# Patient Record
Sex: FEMALE | Race: WHITE | HISPANIC OR LATINO | Employment: PART TIME | URBAN - METROPOLITAN AREA
[De-identification: names, ages, dates, MRNs, and addresses within clinical notes are randomized per-mention and may not be internally consistent; named-entity substitution may affect disease eponyms.]

---

## 2019-09-05 DIAGNOSIS — K02.61 DENTAL CARIES ON SMOOTH SURFACE LIMITED TO ENAMEL: ICD-10-CM

## 2021-05-04 ENCOUNTER — OFFICE VISIT (OUTPATIENT)
Dept: FAMILY MEDICINE CLINIC | Facility: CLINIC | Age: 56
End: 2021-05-04

## 2021-05-04 VITALS
OXYGEN SATURATION: 98 % | DIASTOLIC BLOOD PRESSURE: 74 MMHG | TEMPERATURE: 97.6 F | SYSTOLIC BLOOD PRESSURE: 114 MMHG | HEIGHT: 65 IN | RESPIRATION RATE: 20 BRPM | BODY MASS INDEX: 21.84 KG/M2 | WEIGHT: 131.1 LBS | HEART RATE: 71 BPM

## 2021-05-04 DIAGNOSIS — M25.541 JOINT PAIN IN FINGERS OF BOTH HANDS: ICD-10-CM

## 2021-05-04 DIAGNOSIS — M62.830 MUSCLE SPASM OF BACK: Primary | ICD-10-CM

## 2021-05-04 DIAGNOSIS — M25.552 CHRONIC ARTHRALGIAS OF KNEES AND HIPS: ICD-10-CM

## 2021-05-04 DIAGNOSIS — G89.29 CHRONIC ARTHRALGIAS OF KNEES AND HIPS: ICD-10-CM

## 2021-05-04 DIAGNOSIS — M25.542 JOINT PAIN IN FINGERS OF BOTH HANDS: ICD-10-CM

## 2021-05-04 DIAGNOSIS — M25.562 CHRONIC ARTHRALGIAS OF KNEES AND HIPS: ICD-10-CM

## 2021-05-04 DIAGNOSIS — M25.561 CHRONIC ARTHRALGIAS OF KNEES AND HIPS: ICD-10-CM

## 2021-05-04 DIAGNOSIS — M25.551 CHRONIC ARTHRALGIAS OF KNEES AND HIPS: ICD-10-CM

## 2021-05-04 PROCEDURE — 99214 OFFICE O/P EST MOD 30 MIN: CPT | Performed by: FAMILY MEDICINE

## 2021-05-04 RX ORDER — BACLOFEN 10 MG/1
10 TABLET ORAL 2 TIMES DAILY
Qty: 60 TABLET | Refills: 0 | Status: SHIPPED | OUTPATIENT
Start: 2021-05-04 | End: 2021-06-15 | Stop reason: SDUPTHER

## 2021-05-04 NOTE — PROGRESS NOTES
Assessment/Plan:    No problem-specific Assessment & Plan notes found for this encounter  Diagnoses and all orders for this visit:    Muscle spasm of back  -     baclofen 10 mg tablet; Take 1 tablet (10 mg total) by mouth 2 (two) times a day    Joint pain in fingers of both hands    Chronic arthralgias of knees and hips        BW done through [facility] as ordered by GYN 2/2021, reviewed and discussed with patient  Rheumatological workup essentially negative except for Antinuclear Abs  dsDNA AutoAb <10 TITER Negative    SSA (Ro) AutoAb <20 GIANCARLO Unit <20    SSB (La) AutoAb <20 GIANCARLO Unit <20    SM/RNP AutoAb Negative  Negative    Sm (Marie) AutoAb Negative  Negative    SCL-70 AutoAb Negative  Negative      Rheumatoid Factor <15 IU/mL <10      Suggested moist heat for 15 minutes BID  Can you OTC Salon Pas or Bengay  Start MVI, ideally one specifically for women so it has higher dose of Vitamin D  TSH with Free T4 within normal limits    Subjective:      Patient ID: Acosta Doss is a 50 y o  female  49 yo  female, new patient to the office, with no known medical history complains of:  1- B/l knee pain  2- Hand stiffness R>L unchanged through the day  No swelling  About 1 year ago  Started with R pinky finger, over the last year has now extended to her whole hand  Pain/stiffness worse with activity  Has not tried any medications  Stiffness is accompanied by numbness  Denies weakness  3- Fatigue       The following portions of the patient's history were reviewed and updated as appropriate:   She  has no past medical history on file  She There are no active problems to display for this patient  She  has a past surgical history that includes Cyst Removal   Her family history includes Diabetes in her mother; No Known Problems in her father; Ovarian cancer in her sister  She  reports that she has never smoked   She has never used smokeless tobacco  She reports that she does not drink alcohol or use drugs   Current Outpatient Medications   Medication Sig Dispense Refill    baclofen 10 mg tablet Take 1 tablet (10 mg total) by mouth 2 (two) times a day 60 tablet 0     No current facility-administered medications for this visit  No current outpatient medications on file prior to visit  No current facility-administered medications on file prior to visit       Review of Systems   Musculoskeletal: Positive for arthralgias and myalgias  All other systems reviewed and are negative  Objective:      /74 (BP Location: Left arm, Patient Position: Sitting, Cuff Size: Standard)   Pulse 71   Temp 97 6 °F (36 4 °C) (Temporal)   Resp 20   Ht 5' 5" (1 651 m)   Wt 59 5 kg (131 lb 1 6 oz)   SpO2 98%   BMI 21 82 kg/m²          Physical Exam  Vitals signs and nursing note reviewed  Constitutional:       Appearance: She is well-developed  HENT:      Head: Normocephalic  Right Ear: External ear normal       Left Ear: External ear normal       Nose: Nose normal    Eyes:      Conjunctiva/sclera: Conjunctivae normal       Pupils: Pupils are equal, round, and reactive to light  Neck:      Musculoskeletal: Normal range of motion and neck supple  Thyroid: No thyromegaly  Cardiovascular:      Rate and Rhythm: Normal rate and regular rhythm  Heart sounds: Normal heart sounds  Pulmonary:      Effort: Pulmonary effort is normal       Breath sounds: Normal breath sounds  Abdominal:      Palpations: Abdomen is soft  Tenderness: There is no abdominal tenderness  There is no guarding or rebound  Musculoskeletal: Normal range of motion  General: Tenderness present  Right shoulder: She exhibits tenderness and spasm  Left shoulder: She exhibits tenderness and spasm  Cervical back: She exhibits tenderness and spasm  Skin:     General: Skin is dry  Neurological:      Mental Status: She is alert and oriented to person, place, and time        Deep Tendon Reflexes: Reflexes are normal and symmetric

## 2021-06-15 ENCOUNTER — OFFICE VISIT (OUTPATIENT)
Dept: FAMILY MEDICINE CLINIC | Facility: CLINIC | Age: 56
End: 2021-06-15

## 2021-06-15 VITALS
DIASTOLIC BLOOD PRESSURE: 80 MMHG | TEMPERATURE: 97.7 F | HEART RATE: 73 BPM | OXYGEN SATURATION: 98 % | HEIGHT: 65 IN | WEIGHT: 134.6 LBS | RESPIRATION RATE: 18 BRPM | BODY MASS INDEX: 22.42 KG/M2 | SYSTOLIC BLOOD PRESSURE: 110 MMHG

## 2021-06-15 DIAGNOSIS — M62.830 MUSCLE SPASM OF BACK: ICD-10-CM

## 2021-06-15 DIAGNOSIS — G56.03 BILATERAL CARPAL TUNNEL SYNDROME: Primary | ICD-10-CM

## 2021-06-15 PROCEDURE — 99213 OFFICE O/P EST LOW 20 MIN: CPT | Performed by: FAMILY MEDICINE

## 2021-06-15 RX ORDER — BACLOFEN 10 MG/1
10 TABLET ORAL 2 TIMES DAILY
Qty: 60 TABLET | Refills: 1 | Status: SHIPPED | OUTPATIENT
Start: 2021-06-15

## 2021-06-15 NOTE — PROGRESS NOTES
Assessment/Plan:    No problem-specific Assessment & Plan notes found for this encounter  Diagnoses and all orders for this visit:    Bilateral carpal tunnel syndrome  -     Cock Up Wrist Splint    Muscle spasm of back  -     baclofen 10 mg tablet; Take 1 tablet (10 mg total) by mouth 2 (two) times a day        Restart MVI, only one brand at a time (do NOT double up on vitamins), take in the am with food  Discussed sleep hygiene  Do NOT skip meals  Increase lean protein in your diet   Subjective:      Patient ID: Kevin Yao is a 50 y o  female  51 yo  female, new patient to the office, with no known medical history complains of:  1- B/l knee pain which has improved compared to last visit  2- Hand stiffness R>L unchanged through the day  No swelling  About 1 year ago  Started with R pinky finger, over the last year has now extended to her whole hand  Pain/stiffness worse with activity  Has not tried any medications  Stiffness is accompanied by numbness  Denies weakness  Patient states baclofen helped  3- Fatigue, took multivitamins but states it made her feel anxious (unclear in her account, however I understand she took 3 different MVI at once)  Fatigue  This is a chronic problem  The current episode started more than 1 year ago  The problem occurs constantly  Associated symptoms include fatigue  The following portions of the patient's history were reviewed and updated as appropriate:   She  has no past medical history on file  She There are no problems to display for this patient  She  has a past surgical history that includes Cyst Removal   Her family history includes Diabetes in her mother; No Known Problems in her father; Ovarian cancer in her sister  She  reports that she has never smoked  She has never used smokeless tobacco  She reports that she does not drink alcohol and does not use drugs    Current Outpatient Medications   Medication Sig Dispense Refill    baclofen 10 mg tablet Take 1 tablet (10 mg total) by mouth 2 (two) times a day 60 tablet 1     No current facility-administered medications for this visit  Current Outpatient Medications on File Prior to Visit   Medication Sig    [DISCONTINUED] baclofen 10 mg tablet Take 1 tablet (10 mg total) by mouth 2 (two) times a day     No current facility-administered medications on file prior to visit       Review of Systems   Constitutional: Positive for fatigue  Objective:      /80 (BP Location: Right arm, Patient Position: Sitting, Cuff Size: Standard)   Pulse 73   Temp 97 7 °F (36 5 °C) (Temporal)   Resp 18   Ht 5' 5" (1 651 m)   Wt 61 1 kg (134 lb 9 6 oz)   SpO2 98%   BMI 22 40 kg/m²          Physical Exam  Vitals and nursing note reviewed  Constitutional:       Appearance: She is well-developed  HENT:      Head: Normocephalic  Right Ear: External ear normal       Left Ear: External ear normal       Nose: Nose normal    Eyes:      Conjunctiva/sclera: Conjunctivae normal       Pupils: Pupils are equal, round, and reactive to light  Neck:      Thyroid: No thyromegaly  Cardiovascular:      Rate and Rhythm: Normal rate and regular rhythm  Heart sounds: Normal heart sounds  Pulmonary:      Effort: Pulmonary effort is normal       Breath sounds: Normal breath sounds  Abdominal:      Palpations: Abdomen is soft  Tenderness: There is no abdominal tenderness  There is no guarding or rebound  Musculoskeletal:         General: Tenderness present  Normal range of motion  Right shoulder: Tenderness present  Left shoulder: Tenderness present  Cervical back: Normal range of motion and neck supple  Swelling present  Skin:     General: Skin is dry  Neurological:      Mental Status: She is alert and oriented to person, place, and time  Deep Tendon Reflexes: Reflexes are normal and symmetric

## 2021-07-16 ENCOUNTER — OFFICE VISIT (OUTPATIENT)
Dept: DENTISTRY | Facility: CLINIC | Age: 56
End: 2021-07-16

## 2021-07-16 VITALS — DIASTOLIC BLOOD PRESSURE: 69 MMHG | HEART RATE: 68 BPM | SYSTOLIC BLOOD PRESSURE: 117 MMHG | TEMPERATURE: 97.6 F

## 2021-07-16 DIAGNOSIS — K02.61 DENTAL CARIES ON SMOOTH SURFACE LIMITED TO ENAMEL: Primary | ICD-10-CM

## 2021-07-16 PROCEDURE — D2391 RESIN-BASED COMPOSITE - 1 SURFACE, POSTERIOR: HCPCS | Performed by: DENTIST

## 2021-07-16 PROCEDURE — D2332 RESIN-BASED COMPOSITE - 3 SURFACES, ANTERIOR: HCPCS | Performed by: DENTIST

## 2021-07-16 NOTE — PROGRESS NOTES
Composite Filling    Tennis Julio presents for composite filling  PMH reviewed, no changes  Applied topical benzocaine, administered 1 carp 2% lido 1:100k epi via ASA block      Prepped teeth #7,11,15  with 245 carbide on high speed  Caries removed with round carbide on slow speed  Placed tofflemire/palodent matrix  Isolation with cotton rolls and dri-angles    Etch with 37% H2PO4, rinse, dry  Applied Adhese with 20 second scrub once, gentle air dry and light cured for 10s  Restored with Tetric bulk roseline shade A2 and light cured  Refined with finishing burs, polished with enhance point  Verified occlusion and contacts  Pt left satisfied      NV: Resin on tooth 12 MOD

## 2021-08-16 ENCOUNTER — OFFICE VISIT (OUTPATIENT)
Dept: DENTISTRY | Facility: CLINIC | Age: 56
End: 2021-08-16

## 2021-08-16 VITALS — SYSTOLIC BLOOD PRESSURE: 110 MMHG | TEMPERATURE: 97.5 F | DIASTOLIC BLOOD PRESSURE: 62 MMHG | HEART RATE: 63 BPM

## 2021-08-16 DIAGNOSIS — K02.9 CARIES: Primary | ICD-10-CM

## 2021-08-16 PROCEDURE — D2393 RESIN-BASED COMPOSITE - 3 SURFACES, POSTERIOR: HCPCS | Performed by: DENTIST

## 2021-08-16 NOTE — PROGRESS NOTES
Composite Filling    Nida Mcgee presents for composite filling #12-MOD  PMH reviewed, no changes  Applied topical benzocaine, administered 1 carp 4% articaine 1:100k epi via infiltration    Prepped tooth #12-MOD with 245 carbide on high speed  Previous deficient restoration removed and caries removed with round carbide on slow speed  Placed tofflemire/palodent matrix  Isolation with cotton rolls and dri-angles    Etch with 37% H2PO4, rinse, dry  Applied Adhese with 20 second scrub once, gentle air dry and light cured for 10s  Restored with Tetric bulk roseline shade A2 and light cured  Refined with finishing burs, polished with enhance point  Verified occlusion and contacts  Pt left satisfied      Dr Raya Fowler    NV: 6-mrc

## 2021-10-08 ENCOUNTER — OFFICE VISIT (OUTPATIENT)
Dept: FAMILY MEDICINE CLINIC | Facility: CLINIC | Age: 56
End: 2021-10-08

## 2021-10-08 VITALS
HEART RATE: 74 BPM | OXYGEN SATURATION: 98 % | WEIGHT: 128 LBS | SYSTOLIC BLOOD PRESSURE: 118 MMHG | BODY MASS INDEX: 21.3 KG/M2 | DIASTOLIC BLOOD PRESSURE: 72 MMHG | TEMPERATURE: 96.6 F | RESPIRATION RATE: 16 BRPM

## 2021-10-08 DIAGNOSIS — Z12.12 SCREENING FOR COLORECTAL CANCER: ICD-10-CM

## 2021-10-08 DIAGNOSIS — G56.01 CARPAL TUNNEL SYNDROME OF RIGHT WRIST: ICD-10-CM

## 2021-10-08 DIAGNOSIS — Z11.59 NEED FOR HEPATITIS C SCREENING TEST: Primary | ICD-10-CM

## 2021-10-08 DIAGNOSIS — Z00.00 ANNUAL PHYSICAL EXAM: ICD-10-CM

## 2021-10-08 DIAGNOSIS — Z12.4 SCREENING FOR CERVICAL CANCER: ICD-10-CM

## 2021-10-08 DIAGNOSIS — Z12.11 SCREENING FOR COLORECTAL CANCER: ICD-10-CM

## 2021-10-08 DIAGNOSIS — Z11.4 SCREENING FOR HIV (HUMAN IMMUNODEFICIENCY VIRUS): ICD-10-CM

## 2021-10-08 DIAGNOSIS — Z12.31 ENCOUNTER FOR SCREENING MAMMOGRAM FOR BREAST CANCER: ICD-10-CM

## 2021-10-08 PROCEDURE — 99396 PREV VISIT EST AGE 40-64: CPT | Performed by: FAMILY MEDICINE

## 2021-11-01 ENCOUNTER — APPOINTMENT (OUTPATIENT)
Dept: LAB | Facility: CLINIC | Age: 56
End: 2021-11-01

## 2021-11-01 DIAGNOSIS — Z11.4 SCREENING FOR HIV (HUMAN IMMUNODEFICIENCY VIRUS): ICD-10-CM

## 2021-11-01 DIAGNOSIS — Z11.59 NEED FOR HEPATITIS C SCREENING TEST: ICD-10-CM

## 2021-11-01 LAB — HCV AB SER QL: NORMAL

## 2021-11-01 PROCEDURE — 87389 HIV-1 AG W/HIV-1&-2 AB AG IA: CPT

## 2021-11-01 PROCEDURE — 86803 HEPATITIS C AB TEST: CPT

## 2021-11-01 PROCEDURE — 36415 COLL VENOUS BLD VENIPUNCTURE: CPT

## 2021-11-03 LAB — HIV 1+2 AB+HIV1 P24 AG SERPL QL IA: NORMAL

## 2021-11-11 ENCOUNTER — TELEPHONE (OUTPATIENT)
Dept: FAMILY MEDICINE CLINIC | Facility: CLINIC | Age: 56
End: 2021-11-11

## 2022-01-20 ENCOUNTER — OFFICE VISIT (OUTPATIENT)
Dept: OBGYN CLINIC | Facility: CLINIC | Age: 57
End: 2022-01-20

## 2022-01-20 ENCOUNTER — PATIENT OUTREACH (OUTPATIENT)
Dept: OBGYN CLINIC | Facility: CLINIC | Age: 57
End: 2022-01-20

## 2022-01-20 VITALS
SYSTOLIC BLOOD PRESSURE: 126 MMHG | WEIGHT: 130.6 LBS | HEART RATE: 68 BPM | DIASTOLIC BLOOD PRESSURE: 78 MMHG | BODY MASS INDEX: 21.73 KG/M2

## 2022-01-20 DIAGNOSIS — Z59.9 FINANCIAL DIFFICULTIES: ICD-10-CM

## 2022-01-20 DIAGNOSIS — Z12.4 SCREENING FOR CERVICAL CANCER: ICD-10-CM

## 2022-01-20 DIAGNOSIS — Z01.419 ENCOUNTER FOR ANNUAL ROUTINE GYNECOLOGICAL EXAMINATION: Primary | ICD-10-CM

## 2022-01-20 PROCEDURE — 99386 PREV VISIT NEW AGE 40-64: CPT | Performed by: NURSE PRACTITIONER

## 2022-01-20 SDOH — ECONOMIC STABILITY - INCOME SECURITY: PROBLEM RELATED TO HOUSING AND ECONOMIC CIRCUMSTANCES, UNSPECIFIED: Z59.9

## 2022-01-20 NOTE — PROGRESS NOTES
Assessment/Plan     Diagnoses and all orders for this visit:    Encounter for annual routine gynecological examination  -     Liquid-based pap, screening    Screening for cervical cancer  -     Ambulatory referral to Obstetrics / Gynecology  -     Liquid-based pap, screening         Plan  Patient Instructions   Schedule annual GYN exam in 1 year  Schedule mammogram  Call with needs or concerns      Return in about 1 year (around 2023) for Annual GYN exam   Pt verbalized understanding of all discussed  Subjective      Smith Osorio is a 49 y o  female who presents for annual exam  The patient has no complaints today  The patient is not sexually active  States no partner x 4 years GYN screening history: last pap: Pt states last GYN care and PAP were at [facility], no record available and last mammogram: ordered 10/2021, pt states in the past WNL  The patient is not taking hormone replacement therapy  Patient denies post-menopausal vaginal bleeding    The patient participates in regular exercise: pt states she is active but does not exercise, discussed the importance of exercise and calcium and vitamin D   The patient reports that there is not domestic violence in her life  The patient is not having menopausal symptoms none     Depression Screening Follow-up Plan: Patient's depression screening was negative with a PHQ-2 score of 2  Their PHQ-9 score was 5   Clinically patient does not have depression  No treatment is required  Pt saw Giorgi Spaulding from social work R/T to social [employer], resources provided      Menstrual History:  OB History        6    Para   4    Term   4            AB   2    Living   4       SAB        IAB   2    Ectopic        Multiple        Live Births   3                Menarche age: 15  No LMP recorded   Patient is postmenopausal        The following portions of the patient's history were reviewed and updated as appropriate: allergies, current medications, past family history, past medical history, past social history, past surgical history and problem list     Review of Systems  Pertinent items are noted in HPI       Objective      /78   Pulse 68   Wt 59 2 kg (130 lb 9 6 oz)   BMI 21 73 kg/m²      General:   alert and oriented, in no acute distress, alert, appears stated age and cooperative   Heart: regular rate and rhythm, S1, S2 normal, no murmur, click, rub or gallop   Lungs: clear to auscultation bilaterally, WNL respiratory effort, negative cough or SOB   Thyroid: Negative masses   Abdomen: soft, non-tender, without masses or organomegaly   Vulva: normal   Vagina: normal mucosa   Cervix: no bleeding following Pap, no cervical motion tenderness and no lesions   Uterus: normal size, non-tender, normal shape and consistency   Adnexa: normal adnexa   Breasts: Nt, negative masses, discharge or dimpling         Lab Review  PAP collected mammogram to be scheduled

## 2022-01-20 NOTE — LETTER
2022    To Liliana Bashir  : 1965      This letter is to advise you that your recent PAP SMEAR results were reviewed by me and are NORMAL    We will see you in 1 year for your annual exam     Dustin Oropeza

## 2022-01-20 NOTE — PROGRESS NOTES
MARIA VALLES was consulted by Sumaya Gomez to see patient for current financial concerns as noted in pts social determinants  MARIA VALLES met with the patient in person and used a [employer] interpretor to complete the visit  The patient reports that she lives alone in an apartment  She recently has been having difficulties paying for her utility bills because her hours have been cut at her place of employment  She is interested in looking for a new job, however, she has no transportation (she takes the bus or walks) and needs a job close to her home  She denied having any food insecurities  MARIA VALLES discussed [facility] program, also discussed rental assistance and PA career link  She was interested in receiving information on these resources which [facility] provided to her  MARIA VALLES also provided the [facility] application in [address] [facility] to her with the address for the DP[place] and notified her that she would need to drop off the application to the [facility]  No need for further  assistance  MARIA VALLES closed the referral, MARIA VALLES provided my telephone number and instructed the patient to reach out with any needs  Please re-consult MARIA VALLES if any new needs arise

## 2022-02-24 ENCOUNTER — HOSPITAL ENCOUNTER (OUTPATIENT)
Dept: RADIOLOGY | Facility: HOSPITAL | Age: 57
Discharge: HOME | End: 2022-02-24
Attending: RADIOLOGY

## 2022-02-24 ENCOUNTER — OFFICE VISIT (OUTPATIENT)
Dept: DENTISTRY | Facility: CLINIC | Age: 57
End: 2022-02-24

## 2022-02-24 VITALS — HEART RATE: 63 BPM | TEMPERATURE: 97.1 F | DIASTOLIC BLOOD PRESSURE: 61 MMHG | SYSTOLIC BLOOD PRESSURE: 107 MMHG

## 2022-02-24 DIAGNOSIS — Z76.89 REFERRAL OF PATIENT WITHOUT EXAMINATION OR TREATMENT: ICD-10-CM

## 2022-02-24 DIAGNOSIS — Z01.20 ENCOUNTER FOR DENTAL EXAMINATION: Primary | ICD-10-CM

## 2022-02-24 PROCEDURE — D1110 PROPHYLAXIS - ADULT: HCPCS

## 2022-02-24 PROCEDURE — D1330 ORAL HYGIENE INSTRUCTIONS: HCPCS

## 2022-02-24 PROCEDURE — D0120 PERIODIC ORAL EVALUATION - ESTABLISHED PATIENT: HCPCS

## 2022-02-24 NOTE — PROGRESS NOTES
Adult Prophy     Exams:  Periodic exam   Xrays:     None - not due  Type of Treatment:  Adult Prophy -  Hand scaling,  Polished, Flossed  Reviewed OHI  Brush:  2X/day and Floss 1X/day  Recommended Listerine    EO/OCS Exams:  No significant findings  IO: No significant findings  Oral Hygiene:  Fair    Plaque: Moderate   Calculus:  Light to  Moderate    Bleeding:  Light to Moderate   Gingiva:  Slight gingival inflammation around posterior molars  Stain:  Light   Periocharting was not completed     Caries Findings:  None  Treatment Plan:  Updated   Dr  Exam:  Dr Marce Lagos  Referral:  No referral given   NV:  Tx planning/4 BWX and probing   NVV:  6 MRC

## 2022-05-04 ENCOUNTER — HOSPITAL ENCOUNTER (OUTPATIENT)
Dept: MAMMOGRAPHY | Facility: CLINIC | Age: 57
Discharge: HOME | End: 2022-05-04

## 2022-05-04 VITALS — WEIGHT: 130 LBS | BODY MASS INDEX: 21.66 KG/M2 | HEIGHT: 65 IN

## 2022-05-04 DIAGNOSIS — Z12.31 ENCOUNTER FOR SCREENING MAMMOGRAM FOR BREAST CANCER: ICD-10-CM

## 2022-05-04 PROCEDURE — 77067 SCR MAMMO BI INCL CAD: CPT

## 2022-05-04 PROCEDURE — 77063 BREAST TOMOSYNTHESIS BI: CPT

## 2022-08-31 ENCOUNTER — OFFICE VISIT (OUTPATIENT)
Dept: DENTISTRY | Facility: CLINIC | Age: 57
End: 2022-08-31

## 2022-08-31 VITALS — HEART RATE: 67 BPM | TEMPERATURE: 96.8 F | SYSTOLIC BLOOD PRESSURE: 121 MMHG | DIASTOLIC BLOOD PRESSURE: 64 MMHG

## 2022-08-31 DIAGNOSIS — Z01.21 ENCOUNTER FOR DENTAL EXAMINATION AND CLEANING WITH ABNORMAL FINDINGS: Primary | ICD-10-CM

## 2022-08-31 PROBLEM — K05.30 PERIODONTITIS: Status: ACTIVE | Noted: 2022-08-31

## 2022-08-31 PROCEDURE — D0120 PERIODIC ORAL EVALUATION - ESTABLISHED PATIENT: HCPCS | Performed by: DENTIST

## 2022-08-31 PROCEDURE — D0274 BITEWINGS - 4 RADIOGRAPHIC IMAGES: HCPCS

## 2022-08-31 PROCEDURE — D1110 PROPHYLAXIS - ADULT: HCPCS

## 2022-08-31 RX ORDER — MAGNESIUM GLUCONATE 27 MG(500)
500 TABLET ORAL 2 TIMES DAILY
COMMUNITY

## 2022-08-31 NOTE — PROGRESS NOTES
Pt originally presents for periodic exam, 4 bws, perio charting, adult prophy    Reviewed hhx  ASA I  Nepali translation needed, IPAD used today  CC: "I want to know what is causing the blisters and bumps in my mouth and I have cold sensitivity on the back teeth "     4 bws and pa #30 taken today  Perio charting: generalized 1-3mm pockets with loc 4-6mm pocketing and areas of recession 1-3mm  #30- furcation involvement class II on buccal   Perio: Stage 3, grade B due to furcation involvement on #30  Pt was tx planned for prophy today by Dr Radha Victoria  Ultrasonic and hand sc  Polish and floss  Sl-mod Calc present LA and posterior IP with bleeding  Dr Alphonso Fernandes exam:    Dr Radha Victoria evaluated gum tissue/left side cheek where pt pointed to the area where the blister was yesterday  Pt appears to be pointing to the left salivary gland and states when she pops the blister, blood comes out, not clear liquid  She notices it sporadically and when she feels it, she pops it  Could not replicate today or see any evidence of a blister only redness on left side inner cheek  Advised the patient she should call us when she notices the blister and come in ASAP so we can evaluate the area  #30-furcation inv  needs to be evaluated by Dr Pauline Aldana at the tx planning appt when we take alginate impressions to discuss replacing the missing teeth  Ming Clayton [facility]/ Dr Alphonso Fernandes      NV: assessment of #30- furcation involvement with Dr Pauline Aldana present, also alginate impressions for tx planning if time allows  NV2: 6 mrc, periodic exam with hygiene

## 2022-11-07 ENCOUNTER — OFFICE VISIT (OUTPATIENT)
Dept: DENTISTRY | Facility: CLINIC | Age: 57
End: 2022-11-07

## 2022-11-07 VITALS — HEART RATE: 75 BPM | DIASTOLIC BLOOD PRESSURE: 78 MMHG | SYSTOLIC BLOOD PRESSURE: 120 MMHG

## 2022-11-07 DIAGNOSIS — K02.9 DENTAL DECAY: Primary | ICD-10-CM

## 2022-11-07 NOTE — PROGRESS NOTES
Assessment and GI Filling    Kilo Hagen presents for assessment of #30 furcation and diagnostic impressions  Pt also states she is having sensitivity on UL  Welsh iPad translation used  PMH reviewed, no changes  BW and PA radiographs taken of #13  No PAP or decay noted  Slight open margin on mesial of #13  Pt has cold lingering to #12, 13, 14, but all three were similar and pt was not in extreme pain  Slightly more pain on bite release with tooth slooth on #13  Perio probings WNL  Explained to pt that clinically and radiographically there is no sign of infection or decay  Explained to pt that it is possible there may be a fracture in #13 as fractures are common in premolar teeth  Explained the symptoms may still be developing  Explained to pt we can start with #13 MOD restoration to see if that helps and that further evaluation may be needed in the future if symptoms worsen or persist  Pt understood  Discussed with pt she has decay in the furcation, the area between the two roots, on lower right molar  Recession is present and furcation is partly exposed  Provided pt with treatment options of extraction or GI restoration w/ SDF  Explained benefits of GI and SDF along with staining of SDF  Explained to pt that a restoration on this tooth may extend the life of the tooth, but unsure how long (may be 1 day, 1 week, 1 year, etc)  Explained to pt that in the future this tooth will most likely need extraction  Pt opted to try restoration to try to extend life of tooth, but understands that if pulp is exposed or pulp becomes inflamed that the tooth will need to be extracted  Discussed with patient need for RCT if pulp exposure occurs or in future if pulp is inflamed  Pt understands and consents  Applied topical benzocaine, administered 1 carpule 2% lidocaine 1:100k epi via VALORIE infiltration and 1 carpule 4% articaine 1:100k epi via buccal infiltration        Prepped tooth #30 B(V) with 330 carbide on high speed  Caries removed with round carbide on slow speed  Applied SDF in furcation area  Placed size 0 cord soaked in hemodent  Isolation with cotton rolls  Equia forte GI placed in furcation area  Provided pt with POI and informed her to let us know if sensitivity/pain develops on tooth #30  Pt understands that this tooth will most likely need to be extracted in the future and understands we do not know when it will be (1 day, 1 month, 1 year, etc)  Discussed with pt that the SDF slightly and temporarily stained gingiva  Pt understood  Diagnostic impressions will need to be taken at future visit as there was no remaining time today  Pt left ambulatory and satisfied      NV: #13 MOD restoration

## 2022-11-08 ENCOUNTER — OFFICE VISIT (OUTPATIENT)
Dept: DENTISTRY | Facility: CLINIC | Age: 57
End: 2022-11-08

## 2022-11-08 VITALS — DIASTOLIC BLOOD PRESSURE: 72 MMHG | HEART RATE: 60 BPM | SYSTOLIC BLOOD PRESSURE: 107 MMHG | TEMPERATURE: 97.8 F

## 2022-11-08 DIAGNOSIS — K04.7 CHRONIC DENTAL INFECTION: Primary | ICD-10-CM

## 2022-11-08 NOTE — PROGRESS NOTES
Composite Filling    Amadeo Ram presents for composite filling  PMH reviewed, no changes  ASA:II   Pain: 2/10    13 MOD    Pt complains of pain upon mastication from teeth #12 and 13  Pt stated that teeth are sensitive to cold  Pain does not keep the patient up at night  Endo test completed  #12 Palpation (+), Percussion (-), Cold test (+) lingers >5 sec  #13 Palpation (+), Percission (-), Cold test (+) lingers > 5 sec    X rays from 11/7/22 show MOD restorations present on both #12 and #13, with no evident sign of recurrent decay  Pt has hx of bruxism and grinding  Maxillary and mandibular teeth show signs of severe wear and chipping of existing restorations  Pt was made ware that she will need occlusal guard to wear at night to protect her dentition and ensure that her restorations last longer  Pt understood  Case reviewed by Dr Tian Connors  Today, pt was informed that we will evaluate pt's occlusion and adjust existing restoration of #12 and #13 and monitor them to see if there is any improvement  If the pt is still symptomatic, then we will re-evaluate and may need to replace the restorations  Pt understood  Occlusion was checked with articulating paper  Heavy occlusal marks on existing restorations  Polished restorations with football polishing francine bur  Rechecked occlusion with articulating and paper and adjusted marks accordingly  Pt noticed a significant improvement and said feels no pain now from those teeth  Pt was explained that she needs to come back for impressions for occlusal guard  Pt understood       NV: impressions for occlusal guard/ re-evaluate #12 and #13

## 2023-01-12 ENCOUNTER — OFFICE VISIT (OUTPATIENT)
Dept: DENTISTRY | Facility: CLINIC | Age: 58
End: 2023-01-12

## 2023-01-12 DIAGNOSIS — K08.89 PAIN, DENTAL: Primary | ICD-10-CM

## 2023-01-12 NOTE — PROGRESS NOTES
Evaluation    Tammy Mendiola presented to [facility] for nightguard impressions  PMH reviewed, no changes  Lao translation used  Pt stated she was having pain on both upper left and upper right  Pt stated pain does not wake her up at night, but occurs with cold and when she bites  ASA: II  Pain: 5-6/10    Dr Link Gold helped with evaluation  Pt has 4-5 mm pockets in posterior teeth and upon probings pt was very sensitive  SPRs are advised  PA radiographs of #4, 5, and #12, 13 were taken  Small PAP/widened PDL noted at apex #5  Large restoration approaching pulp  Dentrix note indicated restoration was completed in 2019 and pulp exposure occurred  MTA was placed  Percussion:   #3 (++)  #4 (++)  #5 (-)    Cold testing revealed #5 was most sensitive with 10+ seconds lingering  Other teeth were sensitive to cold, but not as intense/all produced a more similar reponse other than #5  #5 RCT, post, core and crown is needed to save tooth    Pt does have grinding habit and #3 dentin is exposed  #3 MO resin is suggested  Nightguard (maxillary , hard outside, soft inside, remove lateral excursions) is advised, but after treatment  Advised pt to use over the counter  while treatment is being completed  Plan:  #5 RCT  SRPs  #3 MO  Nightguard    All findings and treatment was explained to pt  All questions answered  Pt left ambulatory and satisfied      NV: #5 RCT w/ Dr Mardi Litten 90 mins (can be HP appt)    NV2: SRPs

## 2023-01-23 ENCOUNTER — TELEPHONE (OUTPATIENT)
Dept: OBGYN CLINIC | Facility: CLINIC | Age: 58
End: 2023-01-23

## 2023-02-23 ENCOUNTER — OFFICE VISIT (OUTPATIENT)
Dept: DENTISTRY | Facility: CLINIC | Age: 58
End: 2023-02-23

## 2023-02-23 VITALS — HEART RATE: 69 BPM | SYSTOLIC BLOOD PRESSURE: 117 MMHG | DIASTOLIC BLOOD PRESSURE: 71 MMHG | TEMPERATURE: 96.7 F

## 2023-02-23 DIAGNOSIS — Z01.21 ENCOUNTER FOR DENTAL EXAMINATION AND CLEANING WITH ABNORMAL FINDINGS: Primary | ICD-10-CM

## 2023-02-23 RX ORDER — DULOXETIN HYDROCHLORIDE 20 MG/1
20 CAPSULE, DELAYED RELEASE ORAL DAILY
COMMUNITY
Start: 2022-11-14 | End: 2023-11-14

## 2023-02-23 RX ORDER — HYDROXYCHLOROQUINE SULFATE 200 MG/1
200 TABLET, FILM COATED ORAL DAILY
COMMUNITY
Start: 2022-10-13 | End: 2023-10-13

## 2023-02-23 RX ORDER — MELOXICAM 15 MG/1
15 TABLET ORAL DAILY
COMMUNITY
Start: 2022-09-12 | End: 2023-09-12

## 2023-02-23 NOTE — DENTAL PROCEDURE DETAILS
Reviewed hhx with patient  ASA: II  Pain - 0/10  Pts  CC - Explained perio treatment plan with patient on: I Pad Japanese translation [id number]    Patient presents for SRP in quads :UR    Anesthesia-  Topical gel benzocaine 20% was applied to tissue  Septocaine with epi 4% 1:100,000   1 5 carp was given via short needle  Type of injection:Buccal infiltrations  Neg aspirations and no complications for all  Cavitron and hand instruments used  Bleeding: Slight  Supra/sub calculus was removed from tooth surfaces    OHI: reviewed with the patient the need to maintain proper oral hygiene regimen at home  Brushing 2x/day for 2 minutes, flossing subgingivally daily and mouthrinse 1-2x/day for 60 seconds  Advised patient periodontal disease is an oral disease we can treat and maintain but cannot cure  Without proper dental visits and proper at home dental care this disease may return  Patient understands  Following scaling and root planing, pt will return for 4-6 week perio re-eval  If healing is sufficient, the patient will then continue on 3 month periodontal maintenance appointments  If healing is insufficient, pt may need to be assessed for gingival flap surgery  Pt dismissed in good health, no complications and all questions answered  Pt informed of anesthetic lasting a couple hours after procedure and to be careful eating/chewing until anesthetic has worn off  NV:SRP LR  NV2:SRP UL  NV3:SRP LL  NV4:Assessment following SRPs in all quads

## 2023-02-27 ENCOUNTER — OFFICE VISIT (OUTPATIENT)
Dept: DENTISTRY | Facility: CLINIC | Age: 58
End: 2023-02-27

## 2023-02-27 VITALS — SYSTOLIC BLOOD PRESSURE: 118 MMHG | HEART RATE: 65 BPM | TEMPERATURE: 97.6 F | DIASTOLIC BLOOD PRESSURE: 58 MMHG

## 2023-02-27 DIAGNOSIS — K04.02 SYMPTOMATIC IRREVERSIBLE PULPITIS: Primary | ICD-10-CM

## 2023-02-28 NOTE — PROGRESS NOTES
RCT - Stage 1    Ge Zepeda presents for stage I endo #5  PMH reviewed, no changes  ASA: II  Pain: 0/10    Applied topical benzocaine, administered 1 carpule 4% articaine 1:100k epi via buccal and palatal infiltration  Clamp and rubber dam placed  No caries noted  Large restoration approaching pulp  Dentrix note indicated restoration was completed in 2019 and pulp exposure occurred  MTA was placed  Pulp chamber accessed with round carbide  Bulk of pulp removed with broach files  Working length determined with apex locater, hand filed to [facility] with   NaOCl irrigation  WaveOne glider file used as well as primary file in B canal  P canal felt calcified near apex  Dried canals with paper points  Irrigated with EDTA  PA radiograph taken with hand files  B canal file at apex  L canal short of apex; appears to be calcified  Dried canals with paper points  Placed CaOH2, cotton pellet, and restored with Provisa  Reduced occlusion on tooth  Provided pt with POI  Pt left ambulatory and satisfied  ? Canal/Ref/WL  B: 16 mm (instrumented to 15 5 mm), Ref: B cusp  P: TBD at next visit    Pt left ambulatory and satisfied      ?  NV: rotary file and obturation #5

## 2023-03-06 ENCOUNTER — OFFICE VISIT (OUTPATIENT)
Dept: DENTISTRY | Facility: CLINIC | Age: 58
End: 2023-03-06

## 2023-03-06 VITALS — SYSTOLIC BLOOD PRESSURE: 105 MMHG | HEART RATE: 64 BPM | DIASTOLIC BLOOD PRESSURE: 72 MMHG

## 2023-03-06 DIAGNOSIS — K05.6 PERIODONTAL DISEASE: Primary | ICD-10-CM

## 2023-03-06 DIAGNOSIS — Z59.9 FINANCIAL DIFFICULTIES: ICD-10-CM

## 2023-03-06 SDOH — ECONOMIC STABILITY - INCOME SECURITY: PROBLEM RELATED TO HOUSING AND ECONOMIC CIRCUMSTANCES, UNSPECIFIED: Z59.9

## 2023-03-06 NOTE — DENTAL PROCEDURE DETAILS
Reviewed hhx  ASA: I  Pt understands and speaks some english, mostly Hong Konger  Patient presents for SRP in quads : LR    Anesthesia-  Topical gel benzocaine 20% was applied to tissue  1 carp Septocaine with epi 4% 1:100,000  was given via long needle  Type of injection: right side IANB and long buccal  Neg aspirations and no complications for all  Pt was profoundly numb  Cavitron and hand instruments used  Bleeding: moderate  Supra/sub calculus was removed from tooth surfaces    OHI: reviewed with the patient the need to maintain proper oral hygiene regimen at home by brushing 2x/day and flossing daily  Pt dismissed in good health, no complications and all questions answered  Pt informed of anesthetic lasting a couple hours after procedure and to be careful eating/chewing until anesthetic has worn off       NV: SRP UL   NV2: SRP LL    Starla Sandra, [facility]

## 2023-03-09 ENCOUNTER — OFFICE VISIT (OUTPATIENT)
Dept: FAMILY MEDICINE CLINIC | Facility: CLINIC | Age: 58
End: 2023-03-09

## 2023-03-09 VITALS
DIASTOLIC BLOOD PRESSURE: 70 MMHG | RESPIRATION RATE: 18 BRPM | WEIGHT: 129 LBS | HEART RATE: 64 BPM | SYSTOLIC BLOOD PRESSURE: 126 MMHG | BODY MASS INDEX: 21.47 KG/M2 | TEMPERATURE: 97.8 F | OXYGEN SATURATION: 98 %

## 2023-03-09 DIAGNOSIS — Z23 ENCOUNTER FOR IMMUNIZATION: ICD-10-CM

## 2023-03-09 DIAGNOSIS — Z12.12 SCREENING FOR COLORECTAL CANCER: ICD-10-CM

## 2023-03-09 DIAGNOSIS — Z12.11 SCREENING FOR COLORECTAL CANCER: ICD-10-CM

## 2023-03-09 DIAGNOSIS — N64.4 BREAST PAIN: ICD-10-CM

## 2023-03-09 DIAGNOSIS — E55.9 VITAMIN D DEFICIENCY: ICD-10-CM

## 2023-03-09 DIAGNOSIS — Z83.42 FAMILY HISTORY OF HIGH CHOLESTEROL: ICD-10-CM

## 2023-03-09 DIAGNOSIS — R10.84 GENERALIZED ABDOMINAL PAIN: ICD-10-CM

## 2023-03-09 DIAGNOSIS — R23.3 EASY BRUISING: Primary | ICD-10-CM

## 2023-03-09 DIAGNOSIS — R12 HEART BURN: ICD-10-CM

## 2023-03-09 DIAGNOSIS — Z12.31 ENCOUNTER FOR SCREENING MAMMOGRAM FOR BREAST CANCER: ICD-10-CM

## 2023-03-09 RX ORDER — FAMOTIDINE 20 MG/1
20 TABLET, FILM COATED ORAL DAILY
Qty: 90 TABLET | Refills: 1 | Status: SHIPPED | OUTPATIENT
Start: 2023-03-09

## 2023-03-09 NOTE — PROGRESS NOTES
Name: Patricia Meade      : 1965      MRN: [id number]  Encounter Provider: Zane Casas MD  Encounter Date: 3/9/2023   Encounter department: [address] [place] [address]     1  Easy bruising  -     CBC and differential; Future; Expected date: 2023  -     Comprehensive metabolic panel; Future; Expected date: 2023  -     Lipid panel; Future; Expected date: 2023  -     Microalbumin / creatinine urine ratio; Future; Expected date: 2023  -     TSH, 3rd generation with Free T4 reflex; Future; Expected date: 2023  -     Vitamin D 25 hydroxy; Future; Expected date: 2023    2  Family history of high cholesterol  -     CBC and differential; Future; Expected date: 2023  -     Comprehensive metabolic panel; Future; Expected date: 2023  -     Lipid panel; Future; Expected date: 2023  -     Microalbumin / creatinine urine ratio; Future; Expected date: 2023  -     TSH, 3rd generation with Free T4 reflex; Future; Expected date: 2023  -     Vitamin D 25 hydroxy; Future; Expected date: 2023    3  Vitamin D deficiency  -     Vitamin D 25 hydroxy; Future; Expected date: 2023    4  Generalized abdominal pain  -     US abdomen complete; Future; Expected date: 2023    5  Heart burn  -     US abdomen complete; Future; Expected date: 2023  -     H  pylori breath test; Future; Expected date: 2023  -     famotidine (PEPCID) 20 mg tablet; Take 1 tablet (20 mg total) by mouth daily    6  Breast pain  -     Mammo diagnostic bilateral w cad; Future; Expected date: 2023    7  Encounter for immunization  -     Zoster Vac Recomb Adjuvanted 50 MCG/0 5ML SUSR; Inject 1 each into a muscle 1 (one) time for 1 dose    8  Encounter for screening mammogram for breast cancer  -     Mammo screening bilateral w 3d & cad; Future; Expected date: 2023    9   Screening for colorectal cancer  -     Ambulatory referral for Colonoscopy; Future; Expected date: 03/09/2023         Subjective      55 yo  female with the following complains:  1- Heart burn, does not relate it to food or activity  Has not tried any medications  Denies nausea, vomiting, change in appetite  2- Dry mouth  3- Easy bruising with minimal trauma, non at today's visit   4- Mild abdominal pain, universal, described as pressure, no related symptoms  5- b/l breast pain     Review of Systems   Gastrointestinal: Positive for abdominal pain  Hematological: Bruises/bleeds easily  All other systems reviewed and are negative  Current Outpatient Medications on File Prior to Visit   Medication Sig   • baclofen 10 mg tablet Take 1 tablet (10 mg total) by mouth 2 (two) times a day   • Cholecalciferol 125 MCG/ML LIQD Take 1 mL by mouth daily   • Diclofenac Sodium (VOLTAREN) 1 % Apply 2 g topically 4 (four) times a day   • DULoxetine (CYMBALTA) 20 mg capsule Take 20 mg by mouth daily   • hydroxychloroquine (PLAQUENIL) 200 mg tablet Take 200 mg by mouth daily   • magnesium gluconate (MAGONATE) 500 MG tablet Take 500 mg by mouth 2 (two) times a day   • meloxicam (MOBIC) 15 mg tablet Take 15 mg by mouth daily       Objective     /70 (BP Location: Left arm, Patient Position: Sitting, Cuff Size: Standard)   Pulse 64   Temp 97 8 °F (36 6 °C) (Temporal)   Resp 18   Wt 58 5 kg (129 lb)   SpO2 98%   BMI 21 47 kg/m²     Physical Exam  Vitals and nursing note reviewed  Constitutional:       Appearance: She is well-developed  HENT:      Head: Normocephalic  Right Ear: External ear normal       Left Ear: External ear normal       Nose: Nose normal    Eyes:      Conjunctiva/sclera: Conjunctivae normal       Pupils: Pupils are equal, round, and reactive to light  Neck:      Thyroid: No thyromegaly  Cardiovascular:      Rate and Rhythm: Normal rate and regular rhythm  Heart sounds: Normal heart sounds     Pulmonary:      Effort: Pulmonary effort is normal       Breath sounds: Normal breath sounds  Abdominal:      Palpations: Abdomen is soft  Tenderness: There is abdominal tenderness (mild pain to deep palpation )  There is no guarding or rebound  Musculoskeletal:         General: Normal range of motion  Cervical back: Normal range of motion and neck supple  Skin:     General: Skin is dry  Comments: No bruising     Neurological:      Mental Status: She is alert and oriented to person, place, and time  Deep Tendon Reflexes: Reflexes are normal and symmetric         Zane Casas MD

## 2023-04-24 ENCOUNTER — HOSPITAL ENCOUNTER (OUTPATIENT)
Dept: ULTRASOUND IMAGING | Facility: CLINIC | Age: 58
Discharge: HOME | End: 2023-04-24

## 2023-04-24 ENCOUNTER — HOSPITAL ENCOUNTER (OUTPATIENT)
Dept: MAMMOGRAPHY | Facility: CLINIC | Age: 58
Discharge: HOME | End: 2023-04-24

## 2023-04-24 VITALS — HEIGHT: 64 IN | BODY MASS INDEX: 22.14 KG/M2

## 2023-04-24 DIAGNOSIS — N64.4 BREAST PAIN: ICD-10-CM

## 2023-04-25 ENCOUNTER — OFFICE VISIT (OUTPATIENT)
Dept: DENTISTRY | Facility: CLINIC | Age: 58
End: 2023-04-25

## 2023-04-25 VITALS — HEART RATE: 81 BPM | SYSTOLIC BLOOD PRESSURE: 110 MMHG | DIASTOLIC BLOOD PRESSURE: 74 MMHG

## 2023-04-25 DIAGNOSIS — Z01.21 ENCOUNTER FOR DENTAL EXAMINATION AND CLEANING WITH ABNORMAL FINDINGS: Primary | ICD-10-CM

## 2023-04-25 NOTE — DENTAL PROCEDURE DETAILS
Reviewed hhx  ASA: I  I Pad Tajik translation used #[id number]  Pts CC: Patient is experiencing cold sensitivity in the UL molar region  Explained Sensodyne use and possible Prev 5000 plus use need be  Patient presents for SRP in quads :UL    Anesthesia-  Topical gel benzocaine 20% was applied to tissue  Septocaine with epi 4% 1:100,000   1 carp was given via short needle  Type of injection:Infiltrations  Neg aspirations and no complications for all  Cavitron and hand instruments used  Bleeding: Moderate  Supra/sub calculus was removed from tooth surfaces    OHI: reviewed with the patient the need to maintain proper oral hygiene regimen at home  Brushing 2x/day for 2 minutes, flossing subgingivally daily and mouthrinse 1-2x/day for 60 seconds  Advised patient periodontal disease is an oral disease we can treat and maintain but cannot cure  Without proper dental visits and proper at home dental care this disease may return  Patient understands  Following scaling and root planing, pt will return for 4-6 week perio re-eval  If healing is sufficient, the patient will then continue on 3 month periodontal maintenance appointments  If healing is insufficient, pt may need to be assessed for gingival flap surgery  Pt dismissed in good health, no complications and all questions answered  Pt informed of anesthetic lasting a couple hours after procedure and to be careful eating/chewing until anesthetic has worn off       NV: LL SRP  NV2:4-6 week assessment following SRPs

## 2023-05-08 ENCOUNTER — OFFICE VISIT (OUTPATIENT)
Dept: DENTISTRY | Facility: CLINIC | Age: 58
End: 2023-05-08

## 2023-05-08 VITALS — DIASTOLIC BLOOD PRESSURE: 69 MMHG | SYSTOLIC BLOOD PRESSURE: 119 MMHG | HEART RATE: 64 BPM

## 2023-05-08 DIAGNOSIS — K05.6 PERIODONTAL DISEASE: Primary | ICD-10-CM

## 2023-05-08 NOTE — DENTAL PROCEDURE DETAILS
Reviewed hhx  ASA: I  Russian translation: #[id number]    Patient presents for SRP in quads : LL    Anesthesia-  Topical gel benzocaine 20% was applied to tissue  1 5 carp Septocaine with epi 4% 1:100,000 was given via long and short needle  Type of injection: IANB left side and sue infiltrations  Neg aspirations and no complications for all  Cavitron and hand instruments used  Bleeding: moderate  Supra/sub calculus was removed from tooth surfaces    Following scaling and root planing, pt will return for 4-6 week perio re-eval  If healing is sufficient, the patient will then continue on 3 month periodontal maintenance appointments  If healing is insufficient, pt may need to be assessed for gingival flap surgery  Pt dismissed in good health, no complications and all questions answered  Pt informed of anesthetic lasting a couple hours after procedure and to be careful eating/chewing until anesthetic has worn off  NV: 4-6 week perio re-eval with hygiene     NV2: crown prep scheduled 5/30/23    Sahara Rubio [facility]

## 2023-05-30 ENCOUNTER — OFFICE VISIT (OUTPATIENT)
Dept: DENTISTRY | Facility: CLINIC | Age: 58
End: 2023-05-30

## 2023-05-30 VITALS — DIASTOLIC BLOOD PRESSURE: 70 MMHG | HEART RATE: 64 BPM | SYSTOLIC BLOOD PRESSURE: 115 MMHG

## 2023-05-30 DIAGNOSIS — Z78.9 FULL COVERAGE CROWN NEEDED FOR ROOT CANAL-TREATED TOOTH: Primary | ICD-10-CM

## 2023-05-30 NOTE — PROGRESS NOTES
Post and Core #5     Jacquenette Sandifer presents for post and core #5  Dominican iPAD translation used  Pt states she has some biting, cold sensitivity still  Discussed w/ Dr Gloria Rota that only post and core will be placed, no crown on tooth due to calcification in P canal  Pt states she has pain on #13  Pt understands  No caries or abscess on #13  PMH reviewed, no changes  #5 presents with previous RCT treatment  RCT has good fill to apex with excess in B canal; P canal had calcification and unable to fill canal to apex (see note from 4/12/23)   Pt presents with previous #5 cavit as temporary  Applied topical benzocaine, administered 0 75 carpule 4% articaine 1:100k epi via buccal and palatal  Clamp and rubber dam placed  Temporary restorative material removed and GP accessed with round carbide  Size yellow morita bur used to access B canal 10 mm from B cusp (leaving 6 mm of GP at apex)  Placed tofflemire matrix  Applied Multicore adhesive with 20 second scrub once, gentle air dry and light cured for 10s  Applied multicore to canal  Prefabricated fiber post placed into canal and build-up placed around core  Light cured  No distal contact present  Placed Lawson matrix on distal  Etch with 37% H2PO4, rinse, dry  Applied Adhese with 20 second scrub once, gentle air dry and light cured for 10s  Restored with Tetric bulk roseline shade A2 and light cured  Refined with finishing burs  Verified occlusion and contacts  Post-op radiograph taken and confirmed optimal fill  Dominican iPAD  used to discuss that next step is resin on #3, explained that no crown will be placed on #5 due to calcified P canal  Pt understood  Discussed w/ pt that there is no decay or infection present on #13, most likely pain/discomfort is coming from not having #14 and excessive use of #13/premolars and reminded pt that occlusal guard is in treatment plan  Pt does have wear on teeth  Pt understood      Pt left ambulatory and satisfied        NV: periodontal re-eval post SRPs  NV2: #3 MO resin  NV3: occlusal guard

## 2023-06-06 ENCOUNTER — APPOINTMENT (OUTPATIENT)
Dept: LAB | Facility: HOSPITAL | Age: 58
End: 2023-06-06

## 2023-06-12 ENCOUNTER — OFFICE VISIT (OUTPATIENT)
Dept: DENTISTRY | Facility: CLINIC | Age: 58
End: 2023-06-12

## 2023-06-12 VITALS — HEART RATE: 62 BPM | SYSTOLIC BLOOD PRESSURE: 112 MMHG | DIASTOLIC BLOOD PRESSURE: 62 MMHG

## 2023-06-12 DIAGNOSIS — K05.6 PERIODONTAL DISEASE: Primary | ICD-10-CM

## 2023-06-12 PROCEDURE — D0191 ASSESSMENT OF A PATIENT: HCPCS

## 2023-06-12 NOTE — DENTAL PROCEDURE DETAILS
Periodontal Re-evaluation  Reviewed hhx  ASA: II  Sinhala translation used #[id number]    Perio charting completed  Perio findings:  loc 4-6mm pockets still present around #1, 32  Following recommendations:  Patient has had mostly ideal healing and should remain on 3 month periodontal maintenance appointment intervals  Pt dismissed in good health, no complications and all questions answered       NV: #3-MO filling (previously recommended by dentist)  NV2: Yissel Benitez  with hygiene (due August 9 or after)    Jan Olvera, [facility]

## 2023-07-11 ENCOUNTER — PATIENT OUTREACH (OUTPATIENT)
Dept: DENTISTRY | Facility: CLINIC | Age: 58
End: 2023-07-11

## 2023-07-11 NOTE — PROGRESS NOTES
SW CM referral received 3/6 for pt financial difficulties. Chart review completed. Per chart review, pt completed [facility] assessment and reported difficulty paying for basic needs. Per chart, pt is Syriac speaking. OP SWCM utilized  line and spoke with Shaun [place] [phone]. OP SWCM attempted to leave  but pt's mailbox was full. OP SWCM will try to reach pt another time.

## 2023-08-11 ENCOUNTER — PATIENT OUTREACH (OUTPATIENT)
Dept: DENTISTRY | Facility: CLINIC | Age: 58
End: 2023-08-11

## 2023-08-11 NOTE — LETTER
08/11/23    Estimado/a Nehemiah Suh un coordinador de la atención médica en [address]  [address] [facility] [address] [phone]. Intentamos comunicarnos con usted por teléfono varias veces. Es importante que se comunique con nosotros al [phone] para que podamos ofrecerle ayuda con martin necesidades de [address] [facility]. Atentamente.        NAVARRO Dorantes  Social Work Care Manager

## 2023-10-23 ENCOUNTER — OFFICE VISIT (OUTPATIENT)
Dept: DENTISTRY | Facility: CLINIC | Age: 58
End: 2023-10-23

## 2023-10-23 VITALS — DIASTOLIC BLOOD PRESSURE: 62 MMHG | SYSTOLIC BLOOD PRESSURE: 96 MMHG | HEART RATE: 71 BPM

## 2023-10-23 DIAGNOSIS — F45.8 BRUXISM (TEETH GRINDING): Primary | ICD-10-CM

## 2023-10-23 PROCEDURE — WIS5000 PRELIMINARY IMPRESSIONS

## 2023-10-24 NOTE — DENTAL PROCEDURE DETAILS
Patient arrives for #3 MO composite restoration. There does not appear to be any caries underneath current restoration clinically and radiographically. Took new BW. Patient has no caries. Discussed with patient about moving forward to next phase of treatment plan- creating an occlusal guard due to her grinding. Patient has pain upon tooth #12 and #13 most likely due to grinding as seen in previous notes. Silgimix maxillary and mandibular impressions taken. Blue bite taken. Sent to [facility] to make hard-hard occlusal guard.     Attending: Dr Inocente Varghese    NV: Max Occlusal Guard Delivery

## 2023-11-07 ENCOUNTER — OFFICE VISIT (OUTPATIENT)
Dept: DENTISTRY | Facility: CLINIC | Age: 58
End: 2023-11-07

## 2023-11-07 VITALS — HEART RATE: 80 BPM | SYSTOLIC BLOOD PRESSURE: 120 MMHG | DIASTOLIC BLOOD PRESSURE: 79 MMHG

## 2023-11-07 DIAGNOSIS — K05.30 PERIODONTITIS: Primary | ICD-10-CM

## 2023-11-07 PROCEDURE — D4910 PERIODONTAL MAINTENANCE: HCPCS

## 2023-11-07 NOTE — DENTAL PROCEDURE DETAILS
PERIODONTAL MT     Slovenian Translation [id number]  REVIEWED MED HX: meds, allergies, health changes reviewed in [facility]. All consents signed. CHIEF CONCERN:  none   PAIN SCALE:  0  ASA CLASS:  2  PLAQUE:  heavy plaque and food debris along molars  CALCULUS:  moderate  BLEEDING:   n moderate   STAIN :     moderate     ORAL HYGIENE:    fair   PERIO: FM Probe, areas of 5mm w/  bup. SRP completed last year, explained that patient needs to maintain 3/4mrcs. Hand scaled, polished and flossed. Used Cavitron. Impressions were taken at last visit for NG fabrication. Patient to return for delivery of NG, update BWX and periodicX. Oral Hygiene Instruction:  recommended brushing 2 x daily for 2 minutes MIN, recommended flossing daily, reviewed dietary precautions. Dispensed: toothbrush, toothpaste and floss    Visual and Tactile Intraoral/ Extraoral evaluation: Oral and Oropharyngeal cancer evaluation. No findings     No exam=   Reviewed with patient clinical and radiographic findings and patient verbalized understanding. All questions and concerns addressed.      REFERRALS: no referrals needed    CARIES FINDINGS: to be determined       TREATMENT  PLAN :   1) Deliver NG, 4BWX and PeriodicX w/ Resident    Next Recall: 4 month recall     Last BWX: 8-31-22  Last Panorex:  2-24-21

## 2024-04-26 ENCOUNTER — TELEPHONE (OUTPATIENT)
Dept: FAMILY MEDICINE CLINIC | Facility: CLINIC | Age: 59
End: 2024-04-26

## 2024-04-26 NOTE — TELEPHONE ENCOUNTER
Pt had appt on 4/24/24 and did not show.    Spoke with pt and appt has been rescheduled for 5/16/24

## 2024-05-16 ENCOUNTER — OFFICE VISIT (OUTPATIENT)
Dept: FAMILY MEDICINE CLINIC | Facility: CLINIC | Age: 59
End: 2024-05-16

## 2024-05-16 VITALS
OXYGEN SATURATION: 98 % | TEMPERATURE: 97.8 F | BODY MASS INDEX: 23.37 KG/M2 | SYSTOLIC BLOOD PRESSURE: 118 MMHG | WEIGHT: 127 LBS | RESPIRATION RATE: 18 BRPM | HEIGHT: 62 IN | HEART RATE: 78 BPM | DIASTOLIC BLOOD PRESSURE: 81 MMHG

## 2024-05-16 DIAGNOSIS — K21.9 GASTROESOPHAGEAL REFLUX DISEASE WITHOUT ESOPHAGITIS: ICD-10-CM

## 2024-05-16 DIAGNOSIS — N64.4 BREAST PAIN: Primary | ICD-10-CM

## 2024-05-16 DIAGNOSIS — Z12.31 ENCOUNTER FOR SCREENING MAMMOGRAM FOR BREAST CANCER: ICD-10-CM

## 2024-05-16 DIAGNOSIS — N89.8 VAGINAL DRYNESS: ICD-10-CM

## 2024-05-16 PROCEDURE — 99213 OFFICE O/P EST LOW 20 MIN: CPT | Performed by: FAMILY MEDICINE

## 2024-05-16 RX ORDER — ESTRADIOL 0.1 MG/G
2 CREAM VAGINAL DAILY
Qty: 42.5 G | Refills: 1 | Status: SHIPPED | OUTPATIENT
Start: 2024-05-16 | End: 2024-06-28

## 2024-05-16 RX ORDER — OMEPRAZOLE 20 MG/1
20 CAPSULE, DELAYED RELEASE ORAL DAILY
Qty: 30 CAPSULE | Refills: 3 | Status: SHIPPED | OUTPATIENT
Start: 2024-05-16

## 2024-05-16 NOTE — ASSESSMENT & PLAN NOTE
Valencia been ongoing for the past 2 years. Describes as burning sensation in the right breast a/w breast pain grade 7/10.  Last mammogram was normal  Family history significant for death of her sister due to ovarian cancer     -Mammogram due for this year and f/u w/results  -Advised patient to schedule appointment with me if pain continues  -If mammogram normal and patient still manifesting breat pain consider Breast MRI as patient is 58 years old and has a first degree relative that  diue to ovarian cancer

## 2024-05-16 NOTE — ASSESSMENT & PLAN NOTE
Patient denies lesions in genital area or vaginal discharge.  Patient is not sexually active  Reports vaginal dryness that causes discomfort  Genital physical exam was not done as patient declined    -Estrace cream: apply cream daily for 2 weeks. Then apply only three times a week.  -F/u in apx 1-2 months to attempt doing a genital exam and see if symptoms have improved

## 2024-05-16 NOTE — PROGRESS NOTES
Ambulatory Visit  Name: Aicha Chamorro      : 1965      MRN: [id number]  Encounter Provider: Clare Pat MD  Encounter Date: 2024   Encounter department: [facility]    Assessment & Plan   1. Breast pain  Assessment & Plan:  Ha been ongoing for the past 2 years. Describes as burning sensation in the right breast a/w breast pain grade 7/10.  Last mammogram was normal  Family history significant for death of her sister due to ovarian cancer     -Mammogram due for this year and f/u w/results  -Advised patient to schedule appointment with me if pain continues  -If mammogram normal and patient still manifesting breat pain consider Breast MRI as patient is 58 years old and has a first degree relative that  diue to ovarian cancer  2. Encounter for screening mammogram for breast cancer  -     Mammo screening bilateral w 3d & cad; Future; Expected date: 2024  3. Vaginal dryness  Assessment & Plan:  Patient denies lesions in genital area or vaginal discharge.  Patient is not sexually active  Reports vaginal dryness that causes discomfort  Genital physical exam was not done as patient declined    -Estrace cream: apply cream daily for 2 weeks. Then apply only three times a week.  -F/u in apx 1-2 months to attempt doing a genital exam and see if symptoms have improved   Orders:  -     estradiol (ESTRACE VAGINAL) 0.1 mg/g vaginal cream; Insert 2 g into the vagina daily  4. Gastroesophageal reflux disease without esophagitis  Assessment & Plan:  She has been experiencing belching and eructation, abdominal bloating, heartburn, bilious reflux for 6 months,  Patient denies weight loss, dysphagia, hematemesis, or history of PUD. Social history: no or minimal alcohol, nonsmoker, no ASA or NSAID's.          -The pathophysiology of reflux is discussed.  Anti-reflux measures such as raising the head of the bed, avoiding tight clothing or belts, avoiding eating late at  "night and not lying down shortly after mealtime and achieving weight loss are discussed. Avoid ASA, NSAID's, caffeine, peppermints, alcohol and tobacco.   -Start omeprazole 20 mg qd  Orders:  -     omeprazole (PriLOSEC) 20 mg delayed release capsule; Take 1 capsule (20 mg total) by mouth daily       History of Present Illness     Aicha Davis is a 59 yo female who presents today due to manifesting right sided breast pain which has been ongoing for apx 2 years. She describes the pain as intermittent, burning and grades it 7/10. He last mammogram was normal. Patient reports her sister  of ovarian cancer.   Patient denies weight loss or changes in appetite.          Review of Systems   Constitutional:  Negative for chills and fever.   HENT:  Negative for ear pain and sore throat.    Eyes:  Negative for pain and visual disturbance.   Respiratory:  Negative for cough and shortness of breath.    Cardiovascular:  Negative for chest pain and palpitations.   Gastrointestinal:  Negative for abdominal pain and vomiting.   Genitourinary:  Negative for dysuria and hematuria.   Musculoskeletal:  Negative for arthralgias and back pain.        Breast pain    Skin:  Negative for color change and rash.   Neurological:  Negative for seizures and syncope.   All other systems reviewed and are negative.    Medical History Reviewed by provider this encounter:       Objective     /81 (BP Location: Left arm, Patient Position: Sitting, Cuff Size: Standard)   Pulse 78   Temp 97.8 °F (36.6 °C) (Temporal)   Resp 18   Ht 5' 2\" (1.575 m)   Wt 57.6 kg (127 lb)   SpO2 98%   BMI 23.23 kg/m²     Physical Exam  Vitals and nursing note reviewed.   Constitutional:       General: She is not in acute distress.     Appearance: She is well-developed.   HENT:      Head: Normocephalic and atraumatic.   Eyes:      Conjunctiva/sclera: Conjunctivae normal.   Cardiovascular:      Rate and Rhythm: Normal rate and regular rhythm.      Heart " sounds: No murmur heard.  Pulmonary:      Effort: Pulmonary effort is normal. No respiratory distress.      Breath sounds: Normal breath sounds.   Chest:   Breasts:     Right: Tenderness present. No swelling, inverted nipple, nipple discharge or skin change.      Left: Normal. No swelling, inverted nipple, nipple discharge, skin change or tenderness.   Abdominal:      Palpations: Abdomen is soft.      Tenderness: There is no abdominal tenderness.   Musculoskeletal:         General: No swelling.      Cervical back: Neck supple.   Skin:     General: Skin is warm and dry.      Capillary Refill: Capillary refill takes less than 2 seconds.   Neurological:      Mental Status: She is alert.   Psychiatric:         Mood and Affect: Mood normal.       Administrative Statements   I have spent a total time of 40 minutes on 05/16/24 In caring for this patient including Diagnostic results, Prognosis, Risks and benefits of tx options, Instructions for management, Risk factor reductions, and Counseling / Coordination of care.

## 2024-05-16 NOTE — ASSESSMENT & PLAN NOTE
She has been experiencing belching and eructation, abdominal bloating, heartburn, bilious reflux for 6 months,  Patient denies weight loss, dysphagia, hematemesis, or history of PUD. Social history: no or minimal alcohol, nonsmoker, no ASA or NSAID's.          -The pathophysiology of reflux is discussed.  Anti-reflux measures such as raising the head of the bed, avoiding tight clothing or belts, avoiding eating late at night and not lying down shortly after mealtime and achieving weight loss are discussed. Avoid ASA, NSAID's, caffeine, peppermints, alcohol and tobacco.   -Start omeprazole 20 mg qd

## 2024-09-06 ENCOUNTER — OFFICE VISIT (OUTPATIENT)
Dept: DENTISTRY | Facility: CLINIC | Age: 59
End: 2024-09-06

## 2024-09-06 VITALS — HEART RATE: 110 BPM | DIASTOLIC BLOOD PRESSURE: 82 MMHG | SYSTOLIC BLOOD PRESSURE: 132 MMHG

## 2024-09-06 DIAGNOSIS — K08.531 FRACTURED DENTAL RESTORATION WITH LOSS OF MATERIAL: Primary | ICD-10-CM

## 2024-09-06 PROCEDURE — D0140 LIMITED ORAL EVALUATION - PROBLEM FOCUSED: HCPCS

## 2024-09-06 PROCEDURE — D0220 INTRAORAL - PERIAPICAL FIRST RADIOGRAPHIC IMAGE: HCPCS

## 2024-09-06 PROCEDURE — D7140 EXTRACTION, ERUPTED TOOTH OR EXPOSED ROOT (ELEVATION AND/OR FORCEPS REMOVAL): HCPCS

## 2024-09-06 NOTE — DENTAL PROCEDURE DETAILS
Extraction #1    Aicha Chamorro 58 y.o. female presents with self to [facility] for limited exam extraction #1  PMH reviewed, no changes, ASA II. Significant medical history: n/a. Significant allergies: NKA. Significant medications: n/a.  Pain level 6/10    Diagnosis:  Teeth #1 indicated for extraction due to Nonrestorable caries with fracture of existing amalgam with tooth structure.    Consent:  Risks of specific procedure: pain, bleeding, swelling, infection, tooth fracturing to point of requiring surgical removal, damage to adjacent teeth and/or restorations on them, etc  Risks of any dental procedure: post procedural pain or sensitivity, local anesthetic side effects, allergic reaction to dental materials and medications, breakage of local anesthetic needle, aspiration of small dental tools, injury to nearby hard and soft tissues and anatomical structures.  Benefits: relieve pain or underlying infection, prevent future or further progression of infection, etc  Alternatives: no tx.  Tx plan for extraction #1 reviewed, pt given opportunity to ask questions, all questions answered to degree of medical and dental certainty.  Patient understands and consent given by self via signed oral surgery informed consent.    Universal Protocol  Other Assisting Provider: Yes, Greer (assistant)  Verbal consent obtained? YES  Written consent obtained?  YES  Risks, benefits and alternatives discussed?: YES  Consent given by: self  Time Out  Immediately prior to the procedure a time out was called: YES  Time Out:  Time Out performed at:  9:01 PM  A time out verifies correct patient, procedure, equipment, support staff and site/side marked as required.  Patient states understanding of procedure being performed: YES  Patient's understanding of procedure matches consent: YES  Procedure consent matches procedure scheduled: YES  Test results available and properly labeled: N/A  Site  Verified with the patient  YES  Radiology Images  displayed and confirmed.  If images not available, report reviewed:  YES  Required items - Required blood products, implants, devices and special equipment available: YES  Patient identity confirmed:  YES    Anesthesia:  Topical 20% benzocaine.  2 carps Septocaine 4% administered via buccal and lingual infiltration.    Procedure details:  Reflected gingiva with periosteal elevator.  Elevated, and extracted #1 with universal forceps.  Socket curetted and irrigated with sterile saline.  Manual alveolar compression with gauze 30 seconds. Hemostasis achieved.    Post-op instructions given verbally and on paper.  Patient given ice and gauze.      Patient dismissed ambulatory and alert.    NV: comp exam (pt has nightguard to be delivered here as well,)    Attending: Dr. Cunningham was present in clinic.

## 2024-09-09 ENCOUNTER — TELEPHONE (OUTPATIENT)
Facility: HOSPITAL | Age: 59
End: 2024-09-09

## 2024-09-13 ENCOUNTER — HOSPITAL ENCOUNTER (OUTPATIENT)
Dept: MAMMOGRAPHY | Facility: CLINIC | Age: 59
End: 2024-09-13

## 2024-09-13 VITALS — HEIGHT: 62 IN | BODY MASS INDEX: 23.37 KG/M2 | WEIGHT: 127 LBS

## 2024-09-13 DIAGNOSIS — Z12.31 ENCOUNTER FOR SCREENING MAMMOGRAM FOR BREAST CANCER: ICD-10-CM

## 2024-09-13 PROCEDURE — 77063 BREAST TOMOSYNTHESIS BI: CPT

## 2024-09-13 PROCEDURE — 77067 SCR MAMMO BI INCL CAD: CPT

## 2024-09-24 ENCOUNTER — TELEPHONE (OUTPATIENT)
Dept: FAMILY MEDICINE CLINIC | Facility: CLINIC | Age: 59
End: 2024-09-24

## 2024-09-24 NOTE — TELEPHONE ENCOUNTER
Patient came into office today requesting to be contacted about her mammo results.    Please reach out to pt. Thanks!

## 2024-10-09 ENCOUNTER — TELEPHONE (OUTPATIENT)
Facility: HOSPITAL | Age: 59
End: 2024-10-09

## 2024-10-10 ENCOUNTER — OFFICE VISIT (OUTPATIENT)
Dept: DENTISTRY | Facility: CLINIC | Age: 59
End: 2024-10-10

## 2024-10-10 VITALS — SYSTOLIC BLOOD PRESSURE: 113 MMHG | HEART RATE: 72 BPM | DIASTOLIC BLOOD PRESSURE: 74 MMHG

## 2024-10-10 DIAGNOSIS — K04.4 ACUTE APICAL PERIODONTITIS OF PULPAL ORIGIN: Primary | ICD-10-CM

## 2024-10-10 PROCEDURE — D4910 PERIODONTAL MAINTENANCE: HCPCS

## 2024-10-10 PROCEDURE — D0120 PERIODIC ORAL EVALUATION - ESTABLISHED PATIENT: HCPCS

## 2024-10-10 PROCEDURE — D1330 ORAL HYGIENE INSTRUCTIONS: HCPCS

## 2024-10-10 PROCEDURE — D0277 VERTICAL BITEWINGS - 7 TO 8 RADIOGRAPHIC IMAGES: HCPCS

## 2024-10-10 NOTE — DENTAL PROCEDURE DETAILS
Periodic Exam and 3 Month Periodontal Maintenance ,7 VBWX,OHI     REVIEWED MED HX: meds, allergies, health changes reviewed in EPIC  CHIEF CONCERN: Patient returning after one yr. Perio SRPs were completed in 2023.  PAIN SCALE: 0  ASA CLASS:  ASA 2 - Patient with mild systemic disease with no functional limitations  PLAQUE:  moderate  CALCULUS:  Localized  Light  Upper anteriors  BLEEDING:  moderate  STAIN: Generalized  Moderate  PERIO: FM Probe, areas of 5mm w/  bup. SRP completed last year, explained that patient needs to maintain 3/4mrcs.   Prognosis of #29-32 doesn't favor flap surgery.    Hygiene Procedures: Scaled, Polished, Flossed and Used Cavitron    Oral Hygiene Instruction: Brushing minimum 2x daily for 2 minutes, daily flossing, Water pik, Interproximal brush, and OTC Fluoride rinse    Visual and Tactile Intraoral/ Extraoral evaluation: Oral and Oropharyngeal cancer evaluation. No findings     REFERRALS: None    EXAM: Dr. Velasco    CARIES FINDINGS: Nothing noted    Next Recall: 3 month perio maintenance    Next Restorative: Deliver Occl Guard    Last 7 VBWX

## 2024-10-11 ENCOUNTER — TELEPHONE (OUTPATIENT)
Dept: FAMILY MEDICINE CLINIC | Facility: CLINIC | Age: 59
End: 2024-10-11

## 2024-10-11 NOTE — TELEPHONE ENCOUNTER
first attempt to contact patient. Unable to reach or leave vm due to vm being full. Will try again later.

## 2024-10-15 ENCOUNTER — TELEPHONE (OUTPATIENT)
Facility: HOSPITAL | Age: 59
End: 2024-10-15

## 2024-11-06 ENCOUNTER — OFFICE VISIT (OUTPATIENT)
Dept: FAMILY MEDICINE CLINIC | Facility: CLINIC | Age: 59
End: 2024-11-06

## 2024-11-06 VITALS
TEMPERATURE: 97.8 F | DIASTOLIC BLOOD PRESSURE: 82 MMHG | BODY MASS INDEX: 23.66 KG/M2 | RESPIRATION RATE: 17 BRPM | SYSTOLIC BLOOD PRESSURE: 124 MMHG | WEIGHT: 128.6 LBS | HEART RATE: 79 BPM | HEIGHT: 62 IN | OXYGEN SATURATION: 99 %

## 2024-11-06 DIAGNOSIS — K21.9 GASTROESOPHAGEAL REFLUX DISEASE WITHOUT ESOPHAGITIS: ICD-10-CM

## 2024-11-06 DIAGNOSIS — Z12.11 COLON CANCER SCREENING: ICD-10-CM

## 2024-11-06 DIAGNOSIS — Z00.00 ANNUAL PHYSICAL EXAM: Primary | ICD-10-CM

## 2024-11-06 DIAGNOSIS — Z23 ENCOUNTER FOR IMMUNIZATION: ICD-10-CM

## 2024-11-06 PROCEDURE — 99396 PREV VISIT EST AGE 40-64: CPT | Performed by: FAMILY MEDICINE

## 2024-11-06 PROCEDURE — 90750 HZV VACC RECOMBINANT IM: CPT | Performed by: FAMILY MEDICINE

## 2024-11-06 PROCEDURE — 90673 RIV3 VACCINE NO PRESERV IM: CPT | Performed by: FAMILY MEDICINE

## 2024-11-06 PROCEDURE — 90472 IMMUNIZATION ADMIN EACH ADD: CPT | Performed by: FAMILY MEDICINE

## 2024-11-06 PROCEDURE — 90471 IMMUNIZATION ADMIN: CPT | Performed by: FAMILY MEDICINE

## 2024-11-06 RX ORDER — OMEPRAZOLE 40 MG/1
40 CAPSULE, DELAYED RELEASE ORAL DAILY
Qty: 30 CAPSULE | Refills: 3 | Status: SHIPPED | OUTPATIENT
Start: 2024-11-06

## 2024-11-06 NOTE — PATIENT INSTRUCTIONS
"Patient Education     Routine physical for adults   The Basics   Written by the doctors and editors at [facility]   What is a physical? -- A physical is a routine visit, or \"check-up,\" with your doctor. You might also hear it called a \"wellness visit\" or \"preventive visit.\"  During each visit, the doctor will:   Ask about your physical and mental health   Ask about your habits, behaviors, and lifestyle   Do an exam   Give you vaccines if needed   Talk to you about any medicines you take   Give advice about your health   Answer your questions  Getting regular check-ups is an important part of taking care of your health. It can help your doctor find and treat any problems you have. But it's also important for preventing health problems.  A routine physical is different from a \"sick visit.\" A sick visit is when you see a doctor because of a health concern or problem. Since physicals are scheduled ahead of time, you can think about what you want to ask the doctor.  How often should I get a physical? -- It depends on your age and health. In general, for people age 21 years and older:   If you are younger than 50 years, you might be able to get a physical every 3 years.   If you are 50 years or older, your doctor might recommend a physical every year.  If you have an ongoing health condition, like diabetes or high blood pressure, your doctor will probably want to see you more often.  What happens during a physical? -- In general, each visit will include:   Physical exam - The doctor or nurse will check your height, weight, heart rate, and blood pressure. They will also look at your eyes and ears. They will ask about how you are feeling and whether you have any symptoms that bother you.   Medicines - It's a good idea to bring a list of all the medicines you take to each doctor visit. Your doctor will talk to you about your medicines and answer any questions. Tell them if you are having any side effects that bother you. You " "should also tell them if you are having trouble paying for any of your medicines.   Habits and behaviors - This includes:   Your diet   Your exercise habits   Whether you smoke, drink alcohol, or use drugs   Whether you are sexually active   Whether you feel safe at home  Your doctor will talk to you about things you can do to improve your health and lower your risk of health problems. They will also offer help and support. For example, if you want to quit smoking, they can give you advice and might prescribe medicines. If you want to improve your diet or get more physical activity, they can help you with this, too.   Lab tests, if needed - The tests you get will depend on your age and situation. For example, your doctor might want to check your:   Cholesterol   Blood sugar   Iron level   Vaccines - The recommended vaccines will depend on your age, health, and what vaccines you already had. Vaccines are very important because they can prevent certain serious or deadly infections.   Discussion of screening - \"Screening\" means checking for diseases or other health problems before they cause symptoms. Your doctor can recommend screening based on your age, risk, and preferences. This might include tests to check for:   Cancer, such as breast, prostate, cervical, ovarian, colorectal, prostate, lung, or skin cancer   Sexually transmitted infections, such as chlamydia and gonorrhea   Mental health conditions like depression and anxiety  Your doctor will talk to you about the different types of screening tests. They can help you decide which screenings to have. They can also explain what the results might mean.   Answering questions - The physical is a good time to ask the doctor or nurse questions about your health. If needed, they can refer you to other doctors or specialists, too.  Adults older than 65 years often need other care, too. As you get older, your doctor will talk to you about:   How to prevent falling at " home   Hearing or vision tests   Memory testing   How to take your medicines safely   Making sure that you have the help and support you need at home  All topics are updated as new evidence becomes available and our peer review process is complete.  This topic retrieved from [employer] on: May 02, 2024.  Topic 627082 Version 1.0  Release: 32.4.3 - C32.122  © 2024 UpToDate, Inc. and/or its affiliates. All rights reserved.  Consumer Information Use and Disclaimer   Disclaimer: This generalized information is a limited summary of diagnosis, treatment, and/or medication information. It is not meant to be comprehensive and should be used as a tool to help the user understand and/or assess potential diagnostic and treatment options. It does NOT include all information about conditions, treatments, medications, side effects, or risks that may apply to a specific patient. It is not intended to be medical advice or a substitute for the medical advice, diagnosis, or treatment of a health care provider based on the health care provider's examination and assessment of a patient's specific and unique circumstances. Patients must speak with a health care provider for complete information about their health, medical questions, and treatment options, including any risks or benefits regarding use of medications. This information does not endorse any treatments or medications as safe, effective, or approved for treating a specific patient. UpToDate, Inc. and its affiliates disclaim any warranty or liability relating to this information or the use thereof.The use of this information is governed by the Terms of Use, available at https://www.woltersProjektinouwer.com/en/know/clinical-effectiveness-terms. 2024© UpToDate, Inc. and its affiliates and/or licensors. All rights reserved.  Copyright   © 2024 UpToDate, Inc. and/or its affiliates. All rights reserved.

## 2024-11-06 NOTE — ASSESSMENT & PLAN NOTE
Educated on lifestyle modifications.  Patient reports she has a diet rich in spicy foods and encouraged patient to aishwarya meals that are not spicey as this is one of the components of her reflux being poorly controlled.  Orders:    omeprazole (PriLOSEC) 40 MG capsule; Take 1 capsule (40 mg total) by mouth daily

## 2024-11-06 NOTE — PROGRESS NOTES
Adult Annual Physical  Name: Aicha Chamorro      : 1965      MRN: [id number]  Encounter Provider: Clare Pat MD  Encounter Date: 2024   Encounter department: [facility]    Assessment & Plan  Colon cancer screening    Orders:    Ambulatory referral to Gastroenterology; Future    Annual physical exam    Orders:    Lipid panel; Future    Albumin / creatinine urine ratio; Future    Comprehensive metabolic panel; Future    Encounter for immunization    Orders:    influenza vaccine, recombinant, PF, 0.5 mL IM (Flublok)    Shingrix 50 mcg/0.5M mL IM given in OFFICE    Gastroesophageal reflux disease without esophagitis  Educated on lifestyle modifications.  Patient reports she has a diet rich in spicy foods and encouraged patient to aishwarya meals that are not spicey as this is one of the components of her reflux being poorly controlled.  Orders:    omeprazole (PriLOSEC) 40 MG capsule; Take 1 capsule (40 mg total) by mouth daily    Immunizations and preventive care screenings were discussed with patient today. Appropriate education was printed on patient's after visit summary.    Counseling:  Alcohol/drug use: discussed moderation in alcohol intake, the recommendations for healthy alcohol use, and avoidance of illicit drug use.  Dental Health: discussed importance of regular tooth brushing, flossing, and dental visits.  Injury prevention: discussed safety/seat belts, safety helmets, smoke detectors, carbon monoxide detectors, and smoking near bedding or upholstery.  Sexual health: discussed sexually transmitted diseases, partner selection, use of condoms, avoidance of unintended pregnancy, and contraceptive alternatives.  Exercise: the importance of regular exercise/physical activity was discussed. Recommend exercise 3-5 times per week for at least 30 minutes.          History of Present Illness     Adult Annual Physical:  Patient presents for annual physical.      Diet and Physical Activity:  - Diet/Nutrition: well balanced diet.  - Exercise: walking and no formal exercise.    General Health:  - Sleep: 7-8 hours of sleep on average.  - Hearing: normal hearing right ear.  - Vision: no vision problems.  - Dental: regular dental visits.    /GYN Health:  - Follows with GYN: no.   - Menopause: postmenopausal.   - History of STDs: no  - Contraception: menopause.      Advanced Care Planning:  - Has an advanced directive?: no    - Has a durable medical POA?: no    - ACP document given to patient?: yes      Review of Systems   Constitutional:  Negative for chills and fever.   HENT:  Negative for ear pain and sore throat.    Eyes:  Negative for pain and visual disturbance.   Respiratory:  Negative for cough and shortness of breath.    Cardiovascular:  Negative for chest pain and palpitations.   Gastrointestinal:  Negative for abdominal pain and vomiting.   Genitourinary:  Negative for dysuria and hematuria.   Musculoskeletal:  Negative for arthralgias and back pain.   Skin:  Negative for color change and rash.   Neurological:  Negative for seizures and syncope.   All other systems reviewed and are negative.    Current Outpatient Medications on File Prior to Visit   Medication Sig Dispense Refill    baclofen 10 mg tablet Take 1 tablet (10 mg total) by mouth 2 (two) times a day 60 tablet 1    Cholecalciferol 125 MCG/ML LIQD Take 1 mL by mouth daily      Diclofenac Sodium (VOLTAREN) 1 % Apply 2 g topically 4 (four) times a day 350 g 0    DULoxetine (CYMBALTA) 20 mg capsule Take 20 mg by mouth daily      estradiol (ESTRACE VAGINAL) 0.1 mg/g vaginal cream Insert 2 g into the vagina daily 42.5 g 1    famotidine (PEPCID) 20 mg tablet Take 1 tablet (20 mg total) by mouth daily 90 tablet 1    hydroxychloroquine (PLAQUENIL) 200 mg tablet Take 200 mg by mouth daily      magnesium gluconate (MAGONATE) 500 MG tablet Take 500 mg by mouth 2 (two) times a day      meloxicam (MOBIC) 15 mg tablet  "Take 15 mg by mouth daily      [DISCONTINUED] omeprazole (PriLOSEC) 20 mg delayed release capsule Take 1 capsule (20 mg total) by mouth daily 30 capsule 3     No current facility-administered medications on file prior to visit.        Objective     /82 (BP Location: Left arm, Patient Position: Sitting, Cuff Size: Standard)   Pulse 79   Temp 97.8 °F (36.6 °C) (Temporal)   Resp 17   Ht 5' 2\" (1.575 m)   Wt 58.3 kg (128 lb 9.6 oz)   SpO2 99%   BMI 23.52 kg/m²     Physical Exam  Vitals and nursing note reviewed.   Constitutional:       General: She is not in acute distress.     Appearance: She is well-developed.   HENT:      Head: Normocephalic and atraumatic.   Eyes:      Conjunctiva/sclera: Conjunctivae normal.   Cardiovascular:      Rate and Rhythm: Normal rate and regular rhythm.      Heart sounds: No murmur heard.  Pulmonary:      Effort: Pulmonary effort is normal. No respiratory distress.      Breath sounds: Normal breath sounds.   Abdominal:      Palpations: Abdomen is soft.      Tenderness: There is no abdominal tenderness.   Musculoskeletal:         General: No swelling.      Cervical back: Neck supple.   Skin:     General: Skin is warm and dry.      Capillary Refill: Capillary refill takes less than 2 seconds.   Neurological:      Mental Status: She is alert.   Psychiatric:         Mood and Affect: Mood normal.       Administrative Statements   I have spent a total time of 20 minutes in caring for this patient on the day of the visit/encounter including Diagnostic results, Prognosis, Risks and benefits of tx options, Instructions for management, and Patient and family education.  "

## 2024-11-15 ENCOUNTER — APPOINTMENT (OUTPATIENT)
Dept: LAB | Facility: CLINIC | Age: 59
End: 2024-11-15

## 2024-11-15 DIAGNOSIS — Z00.00 ANNUAL PHYSICAL EXAM: ICD-10-CM

## 2024-11-15 LAB
ALBUMIN SERPL BCG-MCNC: 4.2 G/DL (ref 3.5–5)
ALP SERPL-CCNC: 94 U/L (ref 34–104)
ALT SERPL W P-5'-P-CCNC: 13 U/L (ref 7–52)
ANION GAP SERPL CALCULATED.3IONS-SCNC: 5 MMOL/L (ref 4–13)
AST SERPL W P-5'-P-CCNC: 20 U/L (ref 13–39)
BILIRUB SERPL-MCNC: 1.09 MG/DL (ref 0.2–1)
BUN SERPL-MCNC: 14 MG/DL (ref 5–25)
CALCIUM SERPL-MCNC: 9.1 MG/DL (ref 8.4–10.2)
CHLORIDE SERPL-SCNC: 104 MMOL/L (ref 96–108)
CHOLEST SERPL-MCNC: 178 MG/DL (ref ?–200)
CO2 SERPL-SCNC: 30 MMOL/L (ref 21–32)
CREAT SERPL-MCNC: 0.55 MG/DL (ref 0.6–1.3)
CREAT UR-MCNC: 17 MG/DL
GFR SERPL CREATININE-BSD FRML MDRD: 104 ML/MIN/1.73SQ M
GLUCOSE P FAST SERPL-MCNC: 82 MG/DL (ref 65–99)
HDLC SERPL-MCNC: 68 MG/DL
LDLC SERPL CALC-MCNC: 101 MG/DL (ref 0–100)
MICROALBUMIN UR-MCNC: <7 MG/L
NONHDLC SERPL-MCNC: 110 MG/DL
POTASSIUM SERPL-SCNC: 3.8 MMOL/L (ref 3.5–5.3)
PROT SERPL-MCNC: 6.9 G/DL (ref 6.4–8.4)
SODIUM SERPL-SCNC: 139 MMOL/L (ref 135–147)
TRIGL SERPL-MCNC: 46 MG/DL (ref ?–150)

## 2024-11-15 PROCEDURE — 80053 COMPREHEN METABOLIC PANEL: CPT

## 2024-11-15 PROCEDURE — 82043 UR ALBUMIN QUANTITATIVE: CPT

## 2024-11-15 PROCEDURE — 82570 ASSAY OF URINE CREATININE: CPT

## 2024-11-15 PROCEDURE — 80061 LIPID PANEL: CPT

## 2024-11-15 PROCEDURE — 36415 COLL VENOUS BLD VENIPUNCTURE: CPT

## 2025-01-15 ENCOUNTER — TELEPHONE (OUTPATIENT)
Dept: FAMILY MEDICINE CLINIC | Facility: CLINIC | Age: 60
End: 2025-01-15

## 2025-01-15 NOTE — TELEPHONE ENCOUNTER
Attempted to contact pt to rescheduled 1/16 appt due to pcp unavailable. Unable to reach. Voicemail is full. Appt has been cancelled. Txt message sent.

## 2025-01-16 ENCOUNTER — OFFICE VISIT (OUTPATIENT)
Dept: FAMILY MEDICINE CLINIC | Facility: CLINIC | Age: 60
End: 2025-01-16

## 2025-01-16 VITALS
SYSTOLIC BLOOD PRESSURE: 124 MMHG | WEIGHT: 134 LBS | BODY MASS INDEX: 24.66 KG/M2 | OXYGEN SATURATION: 97 % | TEMPERATURE: 98 F | HEIGHT: 62 IN | HEART RATE: 72 BPM | RESPIRATION RATE: 16 BRPM | DIASTOLIC BLOOD PRESSURE: 80 MMHG

## 2025-01-16 DIAGNOSIS — N64.4 BREAST PAIN: Primary | ICD-10-CM

## 2025-01-16 PROCEDURE — 99213 OFFICE O/P EST LOW 20 MIN: CPT | Performed by: FAMILY MEDICINE

## 2025-01-16 RX ORDER — IBUPROFEN 400 MG/1
400 TABLET, FILM COATED ORAL EVERY 6 HOURS PRN
Qty: 7 TABLET | Refills: 2 | Status: SHIPPED | OUTPATIENT
Start: 2025-01-16

## 2025-01-16 NOTE — ASSESSMENT & PLAN NOTE
Started 2 years ago worsening last 2 months  Mammogram WNL, recommended US for pain area  Not taking any medication  Most likelu Costochondritis vs fibroglandular disease vs mass   Pain on palpation most likely costochondritis  Last menstrual period 5 years ago  Hx ovarian and breast cancer in family    PLAN:  US to rule out benign mass/ malignancy  Ibuprofen 400mg x 5 days to treat if costochondritis   Warm compress  Orders:    US breast right limited (diagnostic); Future    ibuprofen (MOTRIN) 400 mg tablet; Take 1 tablet (400 mg total) by mouth every 6 (six) hours as needed for mild pain

## 2025-01-16 NOTE — PROGRESS NOTES
Name: Aicha Chamorro      : 1965      MRN: [id number]  Encounter Provider: Odette Perry MD  Encounter Date: 2025   Encounter department: [facility]  :  Assessment & Plan  Breast pain  Started 2 years ago worsening last 2 months  Mammogram WNL, recommended US for pain area  Not taking any medication  Most likelu Costochondritis vs fibroglandular disease vs mass   Pain on palpation most likely costochondritis  Last menstrual period 5 years ago  Hx ovarian and breast cancer in family    PLAN:  US to rule out benign mass/ malignancy  Ibuprofen 400mg x 5 days to treat if costochondritis   Warm compress  Orders:    US breast right limited (diagnostic); Future    ibuprofen (MOTRIN) 400 mg tablet; Take 1 tablet (400 mg total) by mouth every 6 (six) hours as needed for mild pain           History of Present Illness     Subjective    Aicha Chamorro is a 59 y.o. female who presents for evaluation of breast pain. Onset was 2 years ago. Symptoms have worsened since that time. The patient describes the pain as pulsating and does not radiate. Patient rates pain as a 6/10 in intensity. Associated symptoms are: none. Aggravating factors are: none. Alleviating factors are: none. Previous imaging screening mammogram: completed recently was normal.    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  Given family history breast and ovarian cancer will do US to rule out mass  mammogram wnl recommended US    Review of Systems   Constitutional:  Negative for chills and fever.   HENT:  Negative for ear pain and sore throat.    Eyes:  Negative for pain and visual disturbance.   Respiratory:  Negative for cough and shortness of breath.    Cardiovascular:  Negative for chest pain and palpitations.   Gastrointestinal:  Negative for abdominal pain and vomiting.   Genitourinary:  Negative for dysuria and hematuria.   Musculoskeletal:  Negative for  arthralgias and back pain.        Breast pain right   Skin:  Negative for color change and rash.   Neurological:  Negative for seizures and syncope.   All other systems reviewed and are negative.      Objective   There were no vitals taken for this visit.     Physical Exam  Vitals and nursing note reviewed.   Constitutional:       General: She is not in acute distress.     Appearance: Normal appearance. She is well-developed.   HENT:      Head: Normocephalic and atraumatic.      Right Ear: Tympanic membrane, ear canal and external ear normal.      Left Ear: Tympanic membrane, ear canal and external ear normal.      Nose: Nose normal.      Mouth/Throat:      Mouth: Mucous membranes are moist.   Eyes:      Extraocular Movements: Extraocular movements intact.      Conjunctiva/sclera: Conjunctivae normal.      Pupils: Pupils are equal, round, and reactive to light.   Cardiovascular:      Rate and Rhythm: Normal rate and regular rhythm.      Pulses: Normal pulses.      Heart sounds: Normal heart sounds. No murmur heard.  Pulmonary:      Effort: Pulmonary effort is normal. No respiratory distress.      Breath sounds: Normal breath sounds.   Abdominal:      General: Bowel sounds are normal.      Palpations: Abdomen is soft.      Tenderness: There is no abdominal tenderness.   Musculoskeletal:         General: No swelling.      Cervical back: Normal range of motion and neck supple.   Skin:     General: Skin is warm and dry.      Capillary Refill: Capillary refill takes less than 2 seconds.   Neurological:      General: No focal deficit present.      Mental Status: She is alert and oriented to person, place, and time. Mental status is at baseline.   Psychiatric:         Mood and Affect: Mood normal.         Behavior: Behavior normal.          body: No supraclavicular, axillary or pectoral adenopathy.   Skin:     General: Skin is warm and dry.      Capillary Refill: Capillary refill takes less than 2 seconds.   Neurological:      General: No focal deficit present.      Mental Status: She is alert and oriented to person, place, and time. Mental status is at baseline.   Psychiatric:         Mood and Affect: Mood normal.         Behavior: Behavior normal.

## 2025-02-17 ENCOUNTER — CONSULT (OUTPATIENT)
Dept: GASTROENTEROLOGY | Facility: CLINIC | Age: 60
End: 2025-02-17

## 2025-02-17 ENCOUNTER — TELEPHONE (OUTPATIENT)
Dept: GASTROENTEROLOGY | Facility: CLINIC | Age: 60
End: 2025-02-17

## 2025-02-17 VITALS
DIASTOLIC BLOOD PRESSURE: 81 MMHG | OXYGEN SATURATION: 97 % | SYSTOLIC BLOOD PRESSURE: 129 MMHG | HEIGHT: 61 IN | TEMPERATURE: 96.1 F | HEART RATE: 72 BPM | BODY MASS INDEX: 24.75 KG/M2 | WEIGHT: 131.1 LBS

## 2025-02-17 DIAGNOSIS — K21.9 GASTROESOPHAGEAL REFLUX DISEASE WITHOUT ESOPHAGITIS: Primary | ICD-10-CM

## 2025-02-17 DIAGNOSIS — Z12.11 COLON CANCER SCREENING: ICD-10-CM

## 2025-02-17 DIAGNOSIS — Z86.19 HISTORY OF HELICOBACTER PYLORI INFECTION: ICD-10-CM

## 2025-02-17 PROCEDURE — 99214 OFFICE O/P EST MOD 30 MIN: CPT | Performed by: INTERNAL MEDICINE

## 2025-02-17 RX ORDER — SODIUM CHLORIDE, SODIUM LACTATE, POTASSIUM CHLORIDE, CALCIUM CHLORIDE 600; 310; 30; 20 MG/100ML; MG/100ML; MG/100ML; MG/100ML
125 INJECTION, SOLUTION INTRAVENOUS CONTINUOUS
OUTPATIENT
Start: 2025-02-17

## 2025-02-17 RX ORDER — POLYETHYLENE GLYCOL 3350, SODIUM SULFATE ANHYDROUS, SODIUM BICARBONATE, SODIUM CHLORIDE, POTASSIUM CHLORIDE 236; 22.74; 6.74; 5.86; 2.97 G/4L; G/4L; G/4L; G/4L; G/4L
4000 POWDER, FOR SOLUTION ORAL ONCE
Qty: 4000 ML | Refills: 0 | Status: SHIPPED | OUTPATIENT
Start: 2025-02-17 | End: 2025-02-17

## 2025-02-17 NOTE — PROGRESS NOTES
Name: Aicha Chamorro      : 1965      MRN: [id number]  Encounter Provider: Mercedes Haq MD  Encounter Date: 2025   Encounter department: [facility] [place]  :  Assessment & Plan  Colon cancer screening  Patient has had no prior colonoscopies. Will order screening colonoscopy. No family history of colon CA.   Orders:    Ambulatory referral to Gastroenterology    Colonoscopy; Future    polyethylene glycol (Golytely) 4000 mL solution; Take 4,000 mL by mouth once for 1 dose Take 4000 mL by mouth once for 1 dose. Use as directed    Gastroesophageal reflux disease without esophagitis  Patient has history of GERD for which she takes omeprazole 40 mg daily. The medication has failed to provide any relief. Since patient has no improvement on PPI will pursue EGD with biopsies for h-pylori.    -EGD with h-pylori biopsies  -Hold omeprazole as it is not helping reflux prior to procedure  Orders:    EGD; Future    History of Helicobacter pylori infection  Patient tested positive in  for h-pylori at a outside facility. Patient states she was unaware of the positive test and never received treatment.     -H-pylori biopsies with EGD, if positive            History of Present Illness   HPI  Aicha Chamorro is a 59 y.o. female who presents for a new patient visit.  Past medical history is notable for GERD, and history of H. pylori.    Patient endorsing on going reflux despite being on omeprazole.  Patient denies any prior history of EGD.  She denies any prior history of colonoscopy as well.  Patient states reflux symptoms occur multiple times a week and omeprazole has not helped them.  Patient does have a remote history of H. pylori back in  however the patient states she was not aware of her H. pylori diagnosis and was never treated.    She denies any family history of colon cancer does endorse that her sister was recently diagnosed with ovarian cancer and her niece was  "diagnosed with breast cancer.      Review of Systems  Per HPI     Objective   /81 (BP Location: Left arm, Patient Position: Sitting, Cuff Size: Standard)   Pulse 72   Temp (!) 96.1 °F (35.6 °C)   Ht 5' 1\" (1.549 m)   Wt 59.5 kg (131 lb 1.6 oz)   SpO2 97%   BMI 24.77 kg/m²      Physical Exam  Pulmonary:      Effort: Pulmonary effort is normal.   Abdominal:      General: Abdomen is flat.      Palpations: Abdomen is soft.   Skin:     General: Skin is warm.   Neurological:      Mental Status: She is alert.   Psychiatric:         Mood and Affect: Mood normal.           "

## 2025-02-17 NOTE — ASSESSMENT & PLAN NOTE
Patient has history of GERD for which she takes omeprazole 40 mg daily. The medication has failed to provide any relief. Since patient has no improvement on PPI will pursue EGD with biopsies for h-pylori.    -EGD with h-pylori biopsies  -Hold omeprazole as it is not helping reflux prior to procedure  Orders:    EGD; Future

## 2025-02-17 NOTE — TELEPHONE ENCOUNTER
Procedure: EGD/Colonoscopy  Date: 03/10/2025  Physician performing: Dr. Tripp  Location of procedure:    Instructions given to patient: Yes  Diabetic: No  Clearances: NA

## 2025-02-20 ENCOUNTER — TELEPHONE (OUTPATIENT)
Age: 60
End: 2025-02-20

## 2025-02-20 NOTE — TELEPHONE ENCOUNTER
Patient contacted office to confirm procedure 3/10. Patient also questioned price of procedure as she is self pay, . [facility] not in network with her insurance.  information given.

## 2025-03-07 ENCOUNTER — TELEPHONE (OUTPATIENT)
Dept: GASTROENTEROLOGY | Facility: HOSPITAL | Age: 60
End: 2025-03-07

## 2025-03-10 ENCOUNTER — ANESTHESIA (OUTPATIENT)
Dept: GASTROENTEROLOGY | Facility: HOSPITAL | Age: 60
End: 2025-03-10
Payer: COMMERCIAL

## 2025-03-10 ENCOUNTER — ANESTHESIA EVENT (OUTPATIENT)
Dept: GASTROENTEROLOGY | Facility: HOSPITAL | Age: 60
End: 2025-03-10
Payer: COMMERCIAL

## 2025-03-10 ENCOUNTER — HOSPITAL ENCOUNTER (OUTPATIENT)
Dept: GASTROENTEROLOGY | Facility: HOSPITAL | Age: 60
Setting detail: OUTPATIENT SURGERY
Discharge: HOME | End: 2025-03-10
Payer: COMMERCIAL

## 2025-03-10 VITALS
SYSTOLIC BLOOD PRESSURE: 118 MMHG | OXYGEN SATURATION: 100 % | TEMPERATURE: 97.4 F | DIASTOLIC BLOOD PRESSURE: 65 MMHG | HEART RATE: 65 BPM | RESPIRATION RATE: 18 BRPM

## 2025-03-10 DIAGNOSIS — Z12.11 COLON CANCER SCREENING: ICD-10-CM

## 2025-03-10 DIAGNOSIS — K21.9 GASTROESOPHAGEAL REFLUX DISEASE WITHOUT ESOPHAGITIS: ICD-10-CM

## 2025-03-10 PROCEDURE — 45385 COLONOSCOPY W/LESION REMOVAL: CPT | Performed by: INTERNAL MEDICINE

## 2025-03-10 PROCEDURE — 43239 EGD BIOPSY SINGLE/MULTIPLE: CPT | Performed by: INTERNAL MEDICINE

## 2025-03-10 PROCEDURE — 88305 TISSUE EXAM BY PATHOLOGIST: CPT | Performed by: PATHOLOGY

## 2025-03-10 RX ORDER — LIDOCAINE HYDROCHLORIDE 20 MG/ML
INJECTION, SOLUTION EPIDURAL; INFILTRATION; INTRACAUDAL; PERINEURAL AS NEEDED
Status: DISCONTINUED | OUTPATIENT
Start: 2025-03-10 | End: 2025-03-10

## 2025-03-10 RX ORDER — PROPOFOL 10 MG/ML
INJECTION, EMULSION INTRAVENOUS CONTINUOUS PRN
Status: DISCONTINUED | OUTPATIENT
Start: 2025-03-10 | End: 2025-03-10

## 2025-03-10 RX ORDER — SODIUM CHLORIDE, SODIUM LACTATE, POTASSIUM CHLORIDE, CALCIUM CHLORIDE 600; 310; 30; 20 MG/100ML; MG/100ML; MG/100ML; MG/100ML
50 INJECTION, SOLUTION INTRAVENOUS CONTINUOUS
Status: CANCELLED | OUTPATIENT
Start: 2025-03-10

## 2025-03-10 RX ORDER — SODIUM CHLORIDE, SODIUM LACTATE, POTASSIUM CHLORIDE, CALCIUM CHLORIDE 600; 310; 30; 20 MG/100ML; MG/100ML; MG/100ML; MG/100ML
50 INJECTION, SOLUTION INTRAVENOUS CONTINUOUS
Status: DISCONTINUED | OUTPATIENT
Start: 2025-03-10 | End: 2025-03-14 | Stop reason: HOSPADM

## 2025-03-10 RX ORDER — PROPOFOL 10 MG/ML
INJECTION, EMULSION INTRAVENOUS AS NEEDED
Status: DISCONTINUED | OUTPATIENT
Start: 2025-03-10 | End: 2025-03-10

## 2025-03-10 RX ORDER — SODIUM CHLORIDE, SODIUM LACTATE, POTASSIUM CHLORIDE, CALCIUM CHLORIDE 600; 310; 30; 20 MG/100ML; MG/100ML; MG/100ML; MG/100ML
125 INJECTION, SOLUTION INTRAVENOUS CONTINUOUS
Status: DISCONTINUED | OUTPATIENT
Start: 2025-03-10 | End: 2025-03-14 | Stop reason: HOSPADM

## 2025-03-10 RX ADMIN — PROPOFOL 150 MG: 10 INJECTION, EMULSION INTRAVENOUS at 12:08

## 2025-03-10 RX ADMIN — SODIUM CHLORIDE, SODIUM LACTATE, POTASSIUM CHLORIDE, AND CALCIUM CHLORIDE 50 ML/HR: .6; .31; .03; .02 INJECTION, SOLUTION INTRAVENOUS at 10:05

## 2025-03-10 RX ADMIN — LIDOCAINE HYDROCHLORIDE 100 MG: 20 INJECTION, SOLUTION EPIDURAL; INFILTRATION; INTRACAUDAL at 12:08

## 2025-03-10 RX ADMIN — PROPOFOL 120 MCG/KG/MIN: 10 INJECTION, EMULSION INTRAVENOUS at 12:09

## 2025-03-10 NOTE — H&P
History and Physical - [facility]  Aicha Chamorro 59 y.o. female MRN: [id number]                  HPI: Aicha Chamorro is a 59 y.o. year old female who presents for EGD and colonoscopy for history of GERD/H pylori and colorectal cancer screening      REVIEW OF SYSTEMS: Per the HPI, and otherwise unremarkable.    Historical Information   History reviewed. No pertinent past medical history.  Past Surgical History:   Procedure Laterality Date    CYST REMOVAL      pt states when she was 17 y/o they removed a mass on her head      Social History   Social History     Substance and Sexual Activity   Alcohol Use Never     Social History     Substance and Sexual Activity   Drug Use Never     Social History     Tobacco Use   Smoking Status Never    Passive exposure: Never   Smokeless Tobacco Never     Family History   Problem Relation Age of Onset    Ovarian cancer Sister     Diabetes Mother         pre diabetes     No Known Problems Father     No Known Problems Daughter     No Known Problems Maternal Grandmother     No Known Problems Maternal Grandfather     No Known Problems Paternal Grandmother     No Known Problems Paternal Grandfather        Meds/Allergies       Current Outpatient Medications:     baclofen 10 mg tablet    Cholecalciferol 125 MCG/ML LIQD    Diclofenac Sodium (VOLTAREN) 1 %    DULoxetine (CYMBALTA) 20 mg capsule    estradiol (ESTRACE VAGINAL) 0.1 mg/g vaginal cream    hydroxychloroquine (PLAQUENIL) 200 mg tablet    ibuprofen (MOTRIN) 400 mg tablet    magnesium gluconate (MAGONATE) 500 MG tablet    meloxicam (MOBIC) 15 mg tablet    omeprazole (PriLOSEC) 40 MG capsule    polyethylene glycol (Golytely) 4000 mL solution    Current Facility-Administered Medications:     lactated ringers infusion, 125 mL/hr, Intravenous, Continuous    lactated ringers infusion, 50 mL/hr, Intravenous, Continuous, 50 mL/hr at 03/10/25 1005    No Known Allergies    Objective     /72   Pulse 79   Temp (!)  97.3 °F (36.3 °C) (Temporal)   Resp 18   SpO2 98%       PHYSICAL EXAM    Gen: NAD  Head: NCAT  CV: RRR  CHEST: Clear  ABD: soft, NT/ND  EXT: no edema      ASSESSMENT/PLAN:  This is a 59 y.o. year old female here for EGD and colonoscopy and she is stable and optimized for her procedure.

## 2025-03-10 NOTE — ANESTHESIA PREPROCEDURE EVALUATION
Procedure:  COLONOSCOPY  EGD    Relevant Problems   CARDIO   (+) Breast pain      GI/HEPATIC   (+) Gastroesophageal reflux disease without esophagitis      Digestive   (+) Periodontitis        Physical Exam    Airway    Mallampati score: II  TM Distance: >3 FB  Neck ROM: full     Dental   No notable dental hx     Cardiovascular  Cardiovascular exam normal    Pulmonary  Pulmonary exam normal     Other Findings  post-pubertal.      Anesthesia Plan  ASA Score- 2     Anesthesia Type- IV sedation with anesthesia with ASA Monitors.         Additional Monitors:     Airway Plan:            Plan Factors-Exercise tolerance (METS): >4 METS.    Chart reviewed.   Existing labs reviewed. Patient summary reviewed.    Patient is not a current smoker. Patient not instructed to abstain from smoking on day of procedure. Patient did not smoke on day of surgery.            Induction-     Postoperative Plan-     Perioperative Resuscitation Plan - Level 1 - Full Code.       Informed Consent- Anesthetic plan and risks discussed with patient.  I personally reviewed this patient with the CRNA. Discussed and agreed on the Anesthesia Plan with the CRNA..      NPO Status:  No vitals data found for the desired time range.      Lab Results   Component Value Date    HGBA1C 5.3 12/08/2020       Lab Results   Component Value Date    K 3.8 11/15/2024     11/15/2024    CO2 30 11/15/2024    BUN 14 11/15/2024    CREATININE 0.55 (L) 11/15/2024    GLUF 82 11/15/2024    CALCIUM 9.1 11/15/2024    AST 20 11/15/2024    ALT 13 11/15/2024    ALKPHOS 94 11/15/2024    EGFR 104 11/15/2024       Lab Results   Component Value Date    WBC 4.75 06/06/2023    HGB 11.9 06/06/2023    HCT 36.0 06/06/2023    MCV 96 06/06/2023     06/06/2023

## 2025-03-14 PROCEDURE — 88305 TISSUE EXAM BY PATHOLOGIST: CPT | Performed by: PATHOLOGY

## 2025-03-16 ENCOUNTER — RESULTS FOLLOW-UP (OUTPATIENT)
Age: 60
End: 2025-03-16

## 2025-03-18 ENCOUNTER — TELEPHONE (OUTPATIENT)
Age: 60
End: 2025-03-18

## 2025-03-18 DIAGNOSIS — A04.8 HELICOBACTER PYLORI INFECTION: Primary | ICD-10-CM

## 2025-03-18 RX ORDER — TETRACYCLINE HYDROCHLORIDE 500 MG/1
CAPSULE ORAL
Qty: 56 CAPSULE | Refills: 0 | Status: SHIPPED | OUTPATIENT
Start: 2025-03-18 | End: 2025-04-01

## 2025-03-18 RX ORDER — BISMUTH SUBSALICYLATE 262 MG/1
TABLET, CHEWABLE ORAL
Qty: 56 TABLET | Refills: 0 | Status: SHIPPED | OUTPATIENT
Start: 2025-03-18

## 2025-03-18 RX ORDER — OMEPRAZOLE 40 MG/1
CAPSULE, DELAYED RELEASE ORAL
Qty: 28 CAPSULE | Refills: 0 | Status: SHIPPED | OUTPATIENT
Start: 2025-03-18

## 2025-03-18 RX ORDER — METRONIDAZOLE 250 MG/1
TABLET ORAL
Qty: 56 TABLET | Refills: 0 | Status: SHIPPED | OUTPATIENT
Start: 2025-03-18 | End: 2025-04-01

## 2025-03-18 NOTE — TELEPHONE ENCOUNTER
Pt. Called back, while using [place] interpretor reviewed biopsy results with recommendations, meds sent to pharmacy on file, thank you

## 2025-03-18 NOTE — TELEPHONE ENCOUNTER
Patient with H-Pylori.Quad therapy ordered.      Sandra Turcios RN informed patient of results and recommendations.

## 2025-03-27 ENCOUNTER — HOSPITAL ENCOUNTER (OUTPATIENT)
Dept: RADIOLOGY | Facility: HOSPITAL | Age: 60
Discharge: HOME | End: 2025-03-27
Attending: RADIOLOGY

## 2025-03-27 ENCOUNTER — HOSPITAL ENCOUNTER (OUTPATIENT)
Dept: RADIOLOGY | Age: 60
Discharge: HOME | End: 2025-03-27

## 2025-03-27 ENCOUNTER — OFFICE VISIT (OUTPATIENT)
Dept: DENTISTRY | Facility: CLINIC | Age: 60
End: 2025-03-27

## 2025-03-27 VITALS — HEART RATE: 67 BPM | SYSTOLIC BLOOD PRESSURE: 117 MMHG | DIASTOLIC BLOOD PRESSURE: 78 MMHG

## 2025-03-27 DIAGNOSIS — Z76.89 REFERRAL OF PATIENT WITHOUT EXAMINATION OR TREATMENT: ICD-10-CM

## 2025-03-27 DIAGNOSIS — Z01.21 ENCOUNTER FOR DENTAL EXAMINATION AND CLEANING WITH ABNORMAL FINDINGS: Primary | ICD-10-CM

## 2025-03-27 PROCEDURE — D0270 BITEWING - SINGLE RADIOGRAPHIC IMAGE: HCPCS

## 2025-03-27 PROCEDURE — D9110 PALLIATIVE (EMERGENCY) TREATMENT OF DENTAL PAIN - MINOR PROCEDURE: HCPCS

## 2025-03-27 PROCEDURE — D0220 INTRAORAL - PERIAPICAL FIRST RADIOGRAPHIC IMAGE: HCPCS

## 2025-03-28 NOTE — PROGRESS NOTES
"    LIMITED EXAM,1 PA AND 1 BWX OF #5   REVIEWED MED HX: meds, allergies, health changes reviewed in [facility]. All consents signed.  CHIEF CONCERN: \" MY TOOTH BROKE AND IT REALLY HURTS WHEN I CHEW.\"  PATIENT WAS POINTING TO #5 WHICH IS AN ENDO TOOTH THAT WAS NEVER CROWNED.  PAIN SCALE:  6/10  ASA CLASS:  II        Dr. Hammond   Reviewed with patient clinical and radiographic findings and patient verbalized understanding. All questions and concerns addressed.   Dr Owusu administered novocain and was able to remove the fractured portion of #5.  Patient is returning in 1 weeks time to have the tooth evaluated for crown lengthening and possible crown.     REFERRALS: none           TREATMENT  PLAN :   NV: Eval #5 for crown lengthening     Next Recall: 3 mth perio maint.      "

## 2025-04-04 ENCOUNTER — TELEPHONE (OUTPATIENT)
Facility: HOSPITAL | Age: 60
End: 2025-04-04

## 2025-04-08 ENCOUNTER — HOSPITAL ENCOUNTER (OUTPATIENT)
Dept: MAMMOGRAPHY | Facility: CLINIC | Age: 60
Discharge: HOME | End: 2025-04-08
Payer: OTHER GOVERNMENT

## 2025-04-08 VITALS — WEIGHT: 131 LBS | HEIGHT: 61 IN | BODY MASS INDEX: 24.73 KG/M2

## 2025-04-08 DIAGNOSIS — N64.4 BREAST PAIN: ICD-10-CM

## 2025-04-08 PROCEDURE — 77065 DX MAMMO INCL CAD UNI: CPT

## 2025-04-08 PROCEDURE — G0279 TOMOSYNTHESIS, MAMMO: HCPCS

## 2025-04-08 PROCEDURE — 76642 ULTRASOUND BREAST LIMITED: CPT

## 2025-04-09 ENCOUNTER — PATIENT OUTREACH (OUTPATIENT)
Facility: HOSPITAL | Age: 60
End: 2025-04-09

## 2025-04-09 NOTE — PROGRESS NOTES
"Program details reviewed (Yes/No):Yes    Patient lives in [place]: (Yes/No):Yes    Uninsured/Underinsured(List):Uninsured    Patient Agreeable to Participation(Yes/No):Yes    Verbal Consent Given (Yes/No):Yes    Adagio Consent form signed \"verba consent\"(Yes/No):Yes    Patient Entered into Med-IT (Yes/No):Yes      "

## 2025-04-10 ENCOUNTER — OFFICE VISIT (OUTPATIENT)
Dept: DENTISTRY | Facility: CLINIC | Age: 60
End: 2025-04-10

## 2025-04-10 VITALS — DIASTOLIC BLOOD PRESSURE: 84 MMHG | HEART RATE: 73 BPM | SYSTOLIC BLOOD PRESSURE: 131 MMHG

## 2025-04-10 DIAGNOSIS — K08.530 TOOTH FRACTURE WITHOUT LOSS OF RESTORATIVE MATERIAL: Primary | ICD-10-CM

## 2025-04-10 PROCEDURE — D0220 INTRAORAL - PERIAPICAL FIRST RADIOGRAPHIC IMAGE: HCPCS

## 2025-04-10 PROCEDURE — D0270 BITEWING - SINGLE RADIOGRAPHIC IMAGE: HCPCS

## 2025-04-10 PROCEDURE — D0140 LIMITED ORAL EVALUATION - PROBLEM FOCUSED: HCPCS

## 2025-04-10 NOTE — PROGRESS NOTES
.Limited Exam  [place]ian Interpretor: Gregoria [id number]  Aicha Chamorro 59 y.o. female presents with self to [place] for Limited exam  PMH reviewed, no changes, ASA II. Significant medical history: periodontitis, GERD. Significant allergies: none. Significant medications: none.    Chief complaint:  My tooth feels better but the cusp is gone  Patient was in for a periodontal maintenance visit last appointment and she had a fractured lingual cusp on tooth #5 so I extracted the lingual cusp in order to alleviate patient's pain. Patient is feeling much better today and we are planning on evaluating the tooth to assess restorability    Consent:  Discussed that limited exam focuses on problem area, and same day tx is not guaranteed.  Patient explained to if they wish to have anything else evaluated, they need to return to the practice at which they are a patient of record or schedule a comprehensive exam afterwards.  Patient understands and consent was given by self via verbal consent.      Objective clinical findings:   Clinical examination: #5 no laverne percha is exposed, post was placed in buccal canal, probing depths are WNL, mild sensitivity to palpation and percussion #5.     Radiographs: Single PA - 5 and Single BW - 5 .  Findings: no signs of PARL, previously endodontically treated, post placed in buccal canal     Assessment:  Tooth #5 we can try to save but prognosis will be guarded  Other options would be to extract tooth and place an implant but patient has generalized wear on teeth and suspected occlusal disease so that might not be favorable either    Plan:   Add a buildup to tooth #5, and perform crown preparation and place a temporary crown on tooth #5  Crown lengthening will be needed on the lingual of #5 and Dr. ROSAS said that laser can be used    Referral(s): None needed.  Rx: None.  Comprehensive care disposition:  Patient of record .    Patient dismissed ambulatory and alert.    NV: #5 crown prep  NV2: #5 crown  lengthening on the lingual    Attending: Dr. Castillo examined pt.

## 2025-04-21 ENCOUNTER — OFFICE VISIT (OUTPATIENT)
Dept: DENTISTRY | Facility: CLINIC | Age: 60
End: 2025-04-21

## 2025-04-21 VITALS — SYSTOLIC BLOOD PRESSURE: 137 MMHG | HEART RATE: 75 BPM | DIASTOLIC BLOOD PRESSURE: 82 MMHG

## 2025-04-21 DIAGNOSIS — Z98.811 DENTAL CROWNS STATUS: Primary | ICD-10-CM

## 2025-04-21 PROCEDURE — D2952 POST AND CORE IN ADDITION TO CROWN, INDIRECTLY FABRICATED: HCPCS

## 2025-04-21 NOTE — PROGRESS NOTES
.Post and Core #5  Quinton Valdez [id number]  Aicha Chamorro 59 y.o. female presents with self to [facility] for post and core #5.  PMH reviewed, no changes, ASA II. Significant medical history: periodontitis. Significant allergies: none. Significant medications: none.  Pain level 0/10.    Diagnosis:  Previously completed root canal therapy. Patient is free of symptoms and tooth apex consistent with normal healing.  Post indicated for retention of core.  Core indicated for restoration of lost tooth structure needed prior to crown prep.    Prognosis:  guarded    Consent:  Risks of specific procedure: perforation of root, 5.  Risks of any dental procedure: post procedural pain or sensitivity, local anesthetic side effects, allergic reaction to dental materials and medications, breakage of local anesthetic needle, aspiration of small dental tools, injury to nearby hard and soft tissues and anatomical structures.  Benefits: support for definitive restoration if pt desires to save tooth  Alternatives: extraction, no tx.  Tx plan for post and core #5 reviewed. Opportunity to ask questions given, all questions answered to degree of medical and dental certainty.  Patient understands and consent given by self via verbal consent.    Anesthesia:  1/2 carps 2% Lidocaine 1:100k epi via palatal/lingual infiltration.    Procedure details:  #5 presents with  Multicore   over access cavity and lingual cusp fractured  Isolation: DryShield , cotton rolls, and high volume suction.  Temporary restorative material removed and laverne percha accessed with round carbide.  Removed necessary amount of laverne percha using heated laverne percha pen.  Post space prepared using post drills for Morita Glass Fiber Post System up to size 1.3 mm (Yellow).  Corresponding glass fiber post tried in canal.  PA radiograph to verify seating taken.  Applied Multicore adhesive with 20 second scrub once, gentle air dry and light cured for 10s.  Applied Multicore Flow core  buildup material to canals.  Glass fiber post placed into canal and build-up placed around core.  Post-op PA radiograph taken.  Checked and adjusted occlusion.    Patient dismissed ambulatory and alert.     NV: #5 crown prep and temporary.  NV2:#5 post and core    Attending: Dr. Castillo checked post space  .

## 2025-05-20 ENCOUNTER — OFFICE VISIT (OUTPATIENT)
Dept: DENTISTRY | Facility: CLINIC | Age: 60
End: 2025-05-20

## 2025-05-20 VITALS — SYSTOLIC BLOOD PRESSURE: 112 MMHG | DIASTOLIC BLOOD PRESSURE: 75 MMHG | HEART RATE: 70 BPM

## 2025-05-20 DIAGNOSIS — Z98.811 FULL COVERAGE CROWN NEEDED FOR ROOT CANAL-TREATED TOOTH: Primary | ICD-10-CM

## 2025-05-20 PROCEDURE — WIS2001 FINAL IMPRESSION - CROWN OR IMPLANT

## 2025-05-20 PROCEDURE — WIS2000 CROWN PREP

## 2025-05-21 NOTE — PROGRESS NOTES
Zirconia Crown Prep, Temp, and Final impression #5    Aicha Chamorro 59 y.o. female presents with self to [facility] for Prep, Temp, and Final impression.  PMH reviewed, no changes, ASA II. Significant medical history: GERD and periodontitis. Significant allergies: NKDA. Significant medications: Omeprazole.    Diagnosis:   Endo-treated tooth in need of full coverage crown    Prognosis:  good    Consent:  Risks of specific procedure: need for RCT if pulp exposure occurs or in future if pulp is inflamed, damage to adjacent tooth and/or restoration.  Risks of any dental procedure: post procedural pain or sensitivity, local anesthetic side effects, allergic reaction to dental materials and medications, breakage of local anesthetic needle, aspiration of small dental tools, injury to nearby hard and soft tissues and anatomical structures.  Benefits: Prevent further breakdown of tooth and its sequelae.  Alternatives: No tx.  Tx plan for crown #5 reviewed. Opportunity to ask questions given, all questions answered to degree of medical and dental certainty.  Patient understands and consent given by self via verbal consent.    Anesthesia:  Topical 20% benzocaine.  1 carps 2% Lidocaine 1:100k epi via buccal infiltration and palatal/lingual infiltration.    Procedure details:  Preliminary stent for provisional crown created with blue-bite and triple tray.  Tooth #5 prepped with reduction for Zirconia.  Made provisional crown with provisa and matrix, refined with francine on high speed.  Confirmed provisional covers margins with no overhangs.  Margin isolation: Packed two cords (size 00 and 0) soaked in hemodent. Removed 1 cord after 5 min for final impression, air dried. Removed other cord after successful final impression..  Final Impression made with medium body PVS, light body PVS, and triple tray(s).  Impression quality examined, confirmed prep captured with no voids or distortions.  Cemented provisional crown using Provisa  temporary cement. Removed excess cement, occlusion and contacts verified.   Selected porcelain shade: A3. Stump shade: A3. Pt confirmed with hand mirror.     Patient dismissed ambulatory and alert.    NV: 6/18/25 for crown delivery #5.    Attending: Dr. Lopez checked crown prep and final impression.

## 2025-06-03 ENCOUNTER — OFFICE VISIT (OUTPATIENT)
Dept: DENTISTRY | Facility: CLINIC | Age: 60
End: 2025-06-03

## 2025-06-03 VITALS — SYSTOLIC BLOOD PRESSURE: 139 MMHG | DIASTOLIC BLOOD PRESSURE: 81 MMHG | HEART RATE: 85 BPM

## 2025-06-03 DIAGNOSIS — Z98.811 DENTAL CROWNS STATUS: Primary | ICD-10-CM

## 2025-06-03 PROCEDURE — WIS2001 FINAL IMPRESSION - CROWN OR IMPLANT

## 2025-06-04 NOTE — PROGRESS NOTES
.[place] Delivery #5- patient came for crown delivery but was deemed unacceptable so took a new impression    Aicha Chamorro 59 y.o. female presents with self to [facility] for crown delivery #5.  PMH reviewed, no changes, ASA II. Significant medical history: GERD. Significant allergies: none. Significant medications: none.    Anesthesia:  Topical 20% benzocaine.  1/2 carps 2% Lidocaine 1:100k epi via buccal infiltration and palatal/lingual infiltration.    Procedure details:  Provisional crown has been missing for two days  Cement residue removed with .  Crown tried on die, confirmed accurate seating, margin will adapted and sealed.  [place] tried intraorally and checked for marginal fit, occlusion, and contacts.  Adjusted occlusion and contacts as needed.  Crown was rocking buccal lingually  Dr. Lopez helped resident to adjust crown to get a better fit  Occlusion was hitting high. Suspect that the tooth moved since the temporary has been missing   [place] needs to be sent back to be remade    Adjusted margins of crown prep  Packed cord size 0 and 1 soaked in hemodent  Used existing crown and melted coin to make a stent for temporary crown  Used provisa temporary material to make a temporary crown  1st cord removed  Took final impression with triple tray, light body and medium body  Made sure new impression captured all anatomy adequately   Cemented new temporary crown with provisa temporary cement and removed excess cement and verified occlusion and contacts    Patient dismissed ambulatory and alert    NV: [place] delivery #5 when returns from lab.    Attending: Dr. Lopez helped resident with crown fit and adjustments.

## 2025-06-18 ENCOUNTER — OFFICE VISIT (OUTPATIENT)
Dept: DENTISTRY | Facility: CLINIC | Age: 60
End: 2025-06-18

## 2025-06-18 VITALS — DIASTOLIC BLOOD PRESSURE: 74 MMHG | HEART RATE: 63 BPM | SYSTOLIC BLOOD PRESSURE: 115 MMHG

## 2025-06-18 DIAGNOSIS — Z98.811 FULL COVERAGE CROWN NEEDED FOR ROOT CANAL-TREATED TOOTH: Primary | ICD-10-CM

## 2025-06-18 PROCEDURE — D2751: HCPCS

## 2025-06-18 PROCEDURE — LAB02 LAB FEE CROWN

## 2025-06-19 NOTE — PROGRESS NOTES
Zirconia [place] Delivery #5    Aicha Chamorro 59 y.o. female presents with self to [facility] for zirconia crown delivery #5.  PMH reviewed, no changes, ASA II. Significant medical history: GERD, periodontitis. Significant allergies: NKDA. Significant medications: Omeprazole.    Anesthesia:  Not needed because previously root canal treated.    Procedure details:  Provisional crown removed.  Cement residue removed with , pumice, and prophy cup.  Zirconia crown tried on die, confirmed accurate seating, margin will adapted and sealed.  [place] tried intraorally and checked for marginal fit, occlusion, and contacts.  Adjusted occlusion and contacts as needed.  Verified seating with radiograph: Single BW - #5.  Confirmed satisfaction of fit and aesthetics with patient mirror. Pt understood this was the last opportunity to request changes if desired. Pt accepted the crown for cementation.    Isolation: cotton rolls and high volume suction  Treated crown intaglio with Ivoclean.  Cemented with Calibra resin cement.  Tack cured 3 sec, excess cement removed with  and high speed suction.  After 3 min, floss through contacts to remove interproximal cement.  Remaining cement removed after 5 min final set.  Re-checked occlusion and contacts.  Took final radiograph: Single BW - #5.    Patient dismissed ambulatory and alert    NV: Will be scheduled for perio maintenance in nearest available hygiene date.    Attending: Dr. Malone and Dr. Gonzales checked X-rays and helped removed remaining cement along crown.

## 2025-06-23 ENCOUNTER — OFFICE VISIT (OUTPATIENT)
Dept: FAMILY MEDICINE CLINIC | Facility: CLINIC | Age: 60
End: 2025-06-23

## 2025-06-23 VITALS
HEART RATE: 71 BPM | HEIGHT: 64 IN | BODY MASS INDEX: 21.51 KG/M2 | RESPIRATION RATE: 16 BRPM | OXYGEN SATURATION: 99 % | SYSTOLIC BLOOD PRESSURE: 110 MMHG | TEMPERATURE: 98 F | DIASTOLIC BLOOD PRESSURE: 70 MMHG | WEIGHT: 126 LBS

## 2025-06-23 DIAGNOSIS — A04.8 H. PYLORI INFECTION: Primary | ICD-10-CM

## 2025-06-23 DIAGNOSIS — G56.02 CARPAL TUNNEL SYNDROME OF LEFT WRIST: ICD-10-CM

## 2025-06-23 DIAGNOSIS — Z76.89 ENCOUNTER TO ESTABLISH CARE: ICD-10-CM

## 2025-06-23 DIAGNOSIS — L81.4 SOLAR LENTIGO: ICD-10-CM

## 2025-06-23 DIAGNOSIS — R42 DIZZY SPELLS: ICD-10-CM

## 2025-06-23 PROCEDURE — 99214 OFFICE O/P EST MOD 30 MIN: CPT | Performed by: FAMILY MEDICINE

## 2025-06-23 NOTE — ASSESSMENT & PLAN NOTE
Continue left wrist stretches and massage  Avoid heavy lifting  Continue to wear left wrist brace

## 2025-06-23 NOTE — PROGRESS NOTES
Name: Aicha Chamorro      : 1965      MRN: [id number]  Encounter Provider: Radha Warren MD  Encounter Date: 2025   Encounter department: [facility]    Assessment & Plan  H. pylori infection  History of EGD and colonoscopy on 2025  Was diagnosed with an H. pylori infection  Patient has not started quad therapy yet as she was unclear on how to take the medication  Explained to patient in detail how to take each of the medications  Repeat H. pylori testing after 4 weeks of completion of therapy       Solar lentigo  Reassured patient's the dark spots on her arms are due to age spots and most likely benign  Can consider referral to dermatology in the future if needed       Carpal tunnel syndrome of left wrist  Continue left wrist stretches and massage  Avoid heavy lifting  Continue to wear left wrist brace         Dizzy spells  Unclear if this is chronic vertigo due to BPPV or a different cause for her lightheadedness/dizziness  Advised patient since this is a longstanding chronic issue for her we can continue to manage conservatively for now, report to office if any symptom changes       Encounter to establish care              History of Present Illness     HPI  Aicha Chamorro is a 59 y.o. female  who presented to the office today to establish care with a new PCP.  Pateint states that she has been feling dizzy for about the past 2 months.  She has the sx daily, and thought it was due to her turning her head rapidly, but has noticed, at times can still feel dizzy even wothout moving her head.    Also has multiple spots on her arms and abdomen that she is concerned about.    Also patient brings in all her medications that was prescribed to her due to a positive H. pylori infection.  Patient states that she does not know how to take the medication    The following portions of the patient's history were reviewed and updated as appropriate: allergies, current  "medications, past family history, past medical history, past social history, past surgical history and problem list.    Review of Systems   Constitutional:  Negative for chills and fever.   HENT:  Negative for congestion, rhinorrhea and sore throat.    Respiratory:  Negative for cough and shortness of breath.    Cardiovascular:  Negative for chest pain.   Gastrointestinal:  Negative for diarrhea, nausea and vomiting.   Skin:  Negative for rash.   Neurological:  Positive for dizziness. Negative for headaches.     Past Medical History[1]  Past Surgical History[2]  Family History[3]  Social History[4]  Medications[5]  No Known Allergies  Immunization History   Administered Date(s) Administered   • INFLUENZA 10/06/2011, 09/28/2012, 10/16/2019, 10/30/2020, 11/18/2022   • Influenza Quadrivalent 3 years and older 10/07/2021   • Influenza Recombinant Preservative Free Im 11/06/2024   • Tdap 01/15/2021   • Zoster Vaccine Recombinant 11/06/2024     Objective   /70 (BP Location: Right arm, Patient Position: Sitting, Cuff Size: Standard)   Pulse 71   Temp 98 °F (36.7 °C) (Temporal)   Resp 16   Ht 5' 4\" (1.626 m)   Wt 57.2 kg (126 lb)   SpO2 99%   BMI 21.63 kg/m²     Physical Exam  Vitals and nursing note reviewed.   Constitutional:       General: She is not in acute distress.     Appearance: She is well-developed.   HENT:      Head: Normocephalic and atraumatic.      Right Ear: External ear normal.      Left Ear: External ear normal.     Eyes:      Conjunctiva/sclera: Conjunctivae normal.       Cardiovascular:      Rate and Rhythm: Normal rate and regular rhythm.      Heart sounds: No murmur heard.  Pulmonary:      Effort: Pulmonary effort is normal. No respiratory distress.      Breath sounds: Normal breath sounds.   Abdominal:      Palpations: Abdomen is soft.      Tenderness: There is no abdominal tenderness.     Musculoskeletal:         General: No swelling.      Cervical back: Neck supple.     Skin:     " General: Skin is warm and dry.      Capillary Refill: Capillary refill takes less than 2 seconds.      Comments: + Hyperpigmented macules dispersed on the exposed surfaces of the bilateral forearms     Neurological:      Mental Status: She is alert and oriented to person, place, and time.     Psychiatric:         Mood and Affect: Mood normal.         Behavior: Behavior normal.                [1]  Past Medical History:  Diagnosis Date   • GERD (gastroesophageal reflux disease)    • H. pylori infection    [2]  Past Surgical History:  Procedure Laterality Date   • CYST REMOVAL      17 y/o right post scalp mas removal   [3]  Family History  Problem Relation Name Age of Onset   • Diabetes Mother          pre diabetes    • Diabetes Father     • Ovarian cancer Sister     • No Known Problems Daughter     • No Known Problems Maternal Grandmother     • No Known Problems Maternal Grandfather     • No Known Problems Paternal Grandmother     • No Known Problems Paternal Grandfather     • Breast cancer Niece Reana 36   • No Known Problems Other     [4]  Social History  Tobacco Use   • Smoking status: Never     Passive exposure: Never   • Smokeless tobacco: Never   Vaping Use   • Vaping status: Never Used   Substance and Sexual Activity   • Alcohol use: Never   • Drug use: Never   • Sexual activity: Not Currently   [5]  Current Outpatient Medications on File Prior to Visit   Medication Sig   • bismuth subsalicylate (PEPTO BISMOL) 262 MG chewable tablet Take 1 tablet by mouth every 6 hours for 14 days   • estradiol (ESTRACE VAGINAL) 0.1 mg/g vaginal cream Insert 2 g into the vagina daily   • omeprazole (PriLOSEC) 40 MG capsule Take 1 capsule (40 mg total) by mouth daily   • omeprazole (PriLOSEC) 40 MG capsule Take 1 capsule by mouth every 12 hours for 14 days

## 2025-06-30 ENCOUNTER — TELEPHONE (OUTPATIENT)
Dept: FAMILY MEDICINE CLINIC | Facility: CLINIC | Age: 60
End: 2025-06-30

## 2025-06-30 NOTE — TELEPHONE ENCOUNTER
Patient came into office requesting medication refill:    omeprazole (PriLOSEC) 40 MG capsule   Please advise. Thank you!

## 2025-07-03 DIAGNOSIS — K21.9 GASTROESOPHAGEAL REFLUX DISEASE WITHOUT ESOPHAGITIS: ICD-10-CM

## 2025-07-03 RX ORDER — OMEPRAZOLE 40 MG/1
40 CAPSULE, DELAYED RELEASE ORAL DAILY
Qty: 30 CAPSULE | Refills: 3 | Status: SHIPPED | OUTPATIENT
Start: 2025-07-03